# Patient Record
Sex: FEMALE | Race: BLACK OR AFRICAN AMERICAN | NOT HISPANIC OR LATINO | Employment: OTHER | URBAN - METROPOLITAN AREA
[De-identification: names, ages, dates, MRNs, and addresses within clinical notes are randomized per-mention and may not be internally consistent; named-entity substitution may affect disease eponyms.]

---

## 2017-01-11 PROBLEM — R92.8 ABNORMAL MAMMOGRAM OF LEFT BREAST: Status: ACTIVE | Noted: 2017-01-11

## 2017-06-19 ENCOUNTER — OFFICE VISIT (OUTPATIENT)
Dept: OBSTETRICS AND GYNECOLOGY | Facility: CLINIC | Age: 72
End: 2017-06-19
Payer: COMMERCIAL

## 2017-06-19 VITALS
SYSTOLIC BLOOD PRESSURE: 100 MMHG | WEIGHT: 202.81 LBS | HEIGHT: 62 IN | DIASTOLIC BLOOD PRESSURE: 60 MMHG | BODY MASS INDEX: 37.32 KG/M2

## 2017-06-19 DIAGNOSIS — Z01.419 ENCOUNTER FOR GYNECOLOGICAL EXAMINATION WITHOUT ABNORMAL FINDING: Primary | ICD-10-CM

## 2017-06-19 PROCEDURE — 99999 PR PBB SHADOW E&M-EST. PATIENT-LVL II: CPT | Mod: PBBFAC,,, | Performed by: OBSTETRICS & GYNECOLOGY

## 2017-06-19 PROCEDURE — 99397 PER PM REEVAL EST PAT 65+ YR: CPT | Mod: S$GLB,,, | Performed by: OBSTETRICS & GYNECOLOGY

## 2017-07-03 NOTE — PROGRESS NOTES
HISTORY OF PRESENT ILLNESS:    Shazia Mai is a 72 y.o. female,   presents today for her routine visit and has no complaints.      Past Medical History:   Diagnosis Date    Asthma     Dyslipidemia     Hypertension     Seizures        Past Surgical History:   Procedure Laterality Date    FRACTURE SURGERY  ,    R foot    HERNIA REPAIR      R inguinal    HYSTERECTOMY      INNER EAR SURGERY         MEDICATIONS AND ALLERGIES:      Current Outpatient Prescriptions:     albuterol (PROVENTIL) 2.5 mg /3 mL (0.083 %) nebulizer solution, Take 3 mLs (2.5 mg total) by nebulization every 6 (six) hours as needed for Wheezing., Disp: 1 Box, Rfl: 6    ergocalciferol (VITAMIN D2) 50,000 unit Cap, Take 1 capsule (50,000 Units total) by mouth every 30 days., Disp: 3 capsule, Rfl: 3    fluticasone-salmeterol 250-50 mcg/dose (ADVAIR DISKUS) 250-50 mcg/dose diskus inhaler, Inhale 1 puff into the lungs 2 (two) times daily., Disp: 3 each, Rfl: 4    fluticasone-salmeterol 250-50 mcg/dose (ADVAIR DISKUS) 250-50 mcg/dose diskus inhaler, Inhale 1 puff into the lungs 2 (two) times daily., Disp: 3 each, Rfl: 4    FLUVIRIN [id number] 45 mcg (15 mcg x 3)/0.5 mL Susp, TO BE ADMINISTERED BY PHARMACIST, Disp: , Rfl: 0    levetiracetam (KEPPRA) 500 MG Tab, Take 500 mg by mouth 2 (two) times daily., Disp: , Rfl: 5    montelukast (SINGULAIR) 10 mg tablet, Take 1 tablet (10 mg total) by mouth every evening., Disp: 90 tablet, Rfl: 3    rosuvastatin (CRESTOR) 20 MG tablet, Take 1 tablet (20 mg total) by mouth once daily., Disp: 90 tablet, Rfl: 3    telmisartan (MICARDIS) 40 MG Tab, Take 1 tablet (40 mg total) by mouth once daily., Disp: 90 tablet, Rfl: 4    valacyclovir (VALTREX) 500 MG tablet, Take 1 tablet (500 mg total) by mouth once daily., Disp: 90 tablet, Rfl: 3    calcium citrate-vitamin D3 (CITRACAL + D MAXIMUM) 315-250 mg-unit Tab, Take 315 mg by mouth 2 (two) times daily., Disp: 180 tablet, Rfl: 6     omeprazole (PRILOSEC) 40 MG capsule, Take 1 capsule (40 mg total) by mouth once daily., Disp: 30 capsule, Rfl: 3    Review of patient's allergies indicates:   Allergen Reactions    Avelox [moxifloxacin]     Duratuss [pseudoephedrine-guaifenesin]        Family History   Problem Relation Age of Onset    Hypertension Mother     Glaucoma Mother     Cancer Father      colon    Breast cancer Neg Hx     Ovarian cancer Neg Hx        Social History     Social History    Marital status:      Spouse name: N/A    Number of children: N/A    Years of education: N/A     Occupational History    Not on file.     Social History Main Topics    Smoking status: Never Smoker    Smokeless tobacco: Never Used    Alcohol use Yes      Comment: occasional    Drug use: No    Sexual activity: Yes     Partners: Male     Birth control/ protection: Surgical     Other Topics Concern    Not on file     Social History Narrative    No narrative on file       COMPREHENSIVE GYN HISTORY:  PAP History: Denies abnormal Paps.  Infection History: Denies STDs. Denies PID.  Benign History: Denies uterine fibroids. Denies ovarian cysts. Denies endometriosis. Denies other conditions.  Cancer History: Denies cervical cancer. Denies uterine cancer or hyperplasia. Denies ovarian cancer. Denies vulvar cancer or pre-cancer. Denies vaginal cancer or pre-cancer. Denies breast cancer. Denies colon cancer.  Sexual Activity History: Reports currently being sexually active  Menstrual History: Denies menses. Pt is  not on ERT.     ROS:  GENERAL: No weight changes. No swelling. No fatigue. No fever.  CARDIOVASCULAR: No chest pain. No shortness of breath. No leg cramps.   NEUROLOGICAL: No headaches. No vision changes.  BREASTS: No pain. No lumps. No discharge.  ABDOMEN: No pain. No nausea. No vomiting. No diarrhea. No constipation.  REPRODUCTIVE: No abnormal bleeding.  VULVA: No pain. No lesions. No itching.  VAGINA: No relaxation. No itching. No  "odor. No discharge. No lesions.  URINARY: No incontinence. No nocturia. No frequency. No dysuria.    /60   Ht 5' 2" (1.575 m)   Wt 92 kg (202 lb 13.2 oz)   BMI 37.10 kg/m²     PE:  APPEARANCE: Well nourished, well developed, in no acute distress.  AFFECT: WNL, alert and oriented x 3.  SKIN: No acne or hirsutism.  NECK: Neck symmetric without masses or thyromegaly.  NODES: No inguinal, cervical, axillary or femoral lymph node enlargement.  CHEST: Good respiratory effort.   ABDOMEN: Soft. No tenderness or masses. No hepatosplenomegaly. No hernias.  BREASTS: Symmetrical, no skin changes or visible lesions. No palpable masses, nipple discharge bilaterally.  PELVIC: ATROPHIC EXTERNAL FEMALE GENITALIA without lesions. Normal hair distribution. Adequate perineal body, normal urethral meatus. VAGINA DRY without lesions or discharge. No significant cystocele or rectocele. Bimanual exam shows CERVIX and UTERUS to be SURGICALLY ABSENT. Adnexa without masses or tenderness.  EXTREMITIES: No edema.    DIAGNOSIS:  1. Encounter for gynecological examination without abnormal finding             COUNSELING:  The patient was counseled today on  -osteoporosis prevention, calcium supplementation, regular weight bearing exercise.  -ACS PAP guidelines (no paps), with recommendations for yearly pelvic exams as her uterus and cervix were removed for benign reasons and ovaries remain;  -recommendation for yearly mammogram;  -to see her primary care physician for all other health maintenance.    FOLLOW-UP with me for next routine visit.     "

## 2017-08-14 ENCOUNTER — HOSPITAL ENCOUNTER (OUTPATIENT)
Facility: HOSPITAL | Age: 72
Discharge: HOME | End: 2017-08-15
Attending: EMERGENCY MEDICINE | Admitting: EMERGENCY MEDICINE
Payer: COMMERCIAL

## 2017-08-14 DIAGNOSIS — R07.9 CHEST PAIN: ICD-10-CM

## 2017-08-14 DIAGNOSIS — R42 VERTIGO: ICD-10-CM

## 2017-08-14 DIAGNOSIS — R07.9 CHEST PAIN, UNSPECIFIED TYPE: Primary | ICD-10-CM

## 2017-08-14 LAB
ALBUMIN SERPL BCP-MCNC: 3.9 G/DL
ALP SERPL-CCNC: 73 U/L
ALT SERPL W/O P-5'-P-CCNC: 13 U/L
ANION GAP SERPL CALC-SCNC: 7 MMOL/L
AST SERPL-CCNC: 16 U/L
BASOPHILS # BLD AUTO: 0.02 K/UL
BASOPHILS NFR BLD: 0.5 %
BILIRUB SERPL-MCNC: 1 MG/DL
BNP SERPL-MCNC: 47 PG/ML
BUN SERPL-MCNC: 7 MG/DL
CALCIUM SERPL-MCNC: 9.6 MG/DL
CHLORIDE SERPL-SCNC: 107 MMOL/L
CO2 SERPL-SCNC: 27 MMOL/L
CREAT SERPL-MCNC: 0.8 MG/DL
DIFFERENTIAL METHOD: ABNORMAL
EOSINOPHIL # BLD AUTO: 0.2 K/UL
EOSINOPHIL NFR BLD: 4.4 %
ERYTHROCYTE [DISTWIDTH] IN BLOOD BY AUTOMATED COUNT: 14.6 %
EST. GFR  (AFRICAN AMERICAN): >60 ML/MIN/1.73 M^2
EST. GFR  (NON AFRICAN AMERICAN): >60 ML/MIN/1.73 M^2
GLUCOSE SERPL-MCNC: 90 MG/DL
HCT VFR BLD AUTO: 38.7 %
HGB BLD-MCNC: 12.8 G/DL
INR PPP: 1.1
LYMPHOCYTES # BLD AUTO: 1.5 K/UL
LYMPHOCYTES NFR BLD: 33.9 %
MCH RBC QN AUTO: 29.1 PG
MCHC RBC AUTO-ENTMCNC: 33.1 G/DL
MCV RBC AUTO: 88 FL
MONOCYTES # BLD AUTO: 0.4 K/UL
MONOCYTES NFR BLD: 8.4 %
NEUTROPHILS # BLD AUTO: 2.3 K/UL
NEUTROPHILS NFR BLD: 52.8 %
PLATELET # BLD AUTO: 185 K/UL
PMV BLD AUTO: 10.3 FL
POTASSIUM SERPL-SCNC: 3.8 MMOL/L
PROT SERPL-MCNC: 7.1 G/DL
PROTHROMBIN TIME: 11.4 SEC
RBC # BLD AUTO: 4.4 M/UL
SODIUM SERPL-SCNC: 141 MMOL/L
TROPONIN I SERPL DL<=0.01 NG/ML-MCNC: <0.006 NG/ML
TROPONIN I SERPL DL<=0.01 NG/ML-MCNC: <0.006 NG/ML
WBC # BLD AUTO: 4.31 K/UL

## 2017-08-14 PROCEDURE — 84484 ASSAY OF TROPONIN QUANT: CPT

## 2017-08-14 PROCEDURE — 85610 PROTHROMBIN TIME: CPT

## 2017-08-14 PROCEDURE — 99285 EMERGENCY DEPT VISIT HI MDM: CPT

## 2017-08-14 PROCEDURE — 93005 ELECTROCARDIOGRAM TRACING: CPT

## 2017-08-14 PROCEDURE — 84484 ASSAY OF TROPONIN QUANT: CPT | Mod: 91

## 2017-08-14 PROCEDURE — 25000003 PHARM REV CODE 250: Performed by: INTERNAL MEDICINE

## 2017-08-14 PROCEDURE — 25000003 PHARM REV CODE 250: Performed by: EMERGENCY MEDICINE

## 2017-08-14 PROCEDURE — 83880 ASSAY OF NATRIURETIC PEPTIDE: CPT

## 2017-08-14 PROCEDURE — G0378 HOSPITAL OBSERVATION PER HR: HCPCS

## 2017-08-14 PROCEDURE — 80053 COMPREHEN METABOLIC PANEL: CPT

## 2017-08-14 PROCEDURE — 85025 COMPLETE CBC W/AUTO DIFF WBC: CPT

## 2017-08-14 PROCEDURE — 36415 COLL VENOUS BLD VENIPUNCTURE: CPT

## 2017-08-14 PROCEDURE — 63600175 PHARM REV CODE 636 W HCPCS: Performed by: EMERGENCY MEDICINE

## 2017-08-14 PROCEDURE — 93010 ELECTROCARDIOGRAM REPORT: CPT | Mod: ,,, | Performed by: INTERNAL MEDICINE

## 2017-08-14 RX ORDER — ACETAMINOPHEN 325 MG/1
650 TABLET ORAL EVERY 8 HOURS PRN
Status: DISCONTINUED | OUTPATIENT
Start: 2017-08-14 | End: 2017-08-15 | Stop reason: HOSPADM

## 2017-08-14 RX ORDER — ACETAMINOPHEN 325 MG/1
650 TABLET ORAL EVERY 6 HOURS PRN
Status: DISCONTINUED | OUTPATIENT
Start: 2017-08-14 | End: 2017-08-15 | Stop reason: HOSPADM

## 2017-08-14 RX ORDER — LEVETIRACETAM 500 MG/1
500 TABLET ORAL 2 TIMES DAILY
Status: DISCONTINUED | OUTPATIENT
Start: 2017-08-14 | End: 2017-08-15 | Stop reason: HOSPADM

## 2017-08-14 RX ORDER — ONDANSETRON 2 MG/ML
4 INJECTION INTRAMUSCULAR; INTRAVENOUS EVERY 12 HOURS PRN
Status: DISCONTINUED | OUTPATIENT
Start: 2017-08-14 | End: 2017-08-15 | Stop reason: HOSPADM

## 2017-08-14 RX ORDER — MORPHINE SULFATE 10 MG/ML
2 INJECTION INTRAMUSCULAR; INTRAVENOUS; SUBCUTANEOUS EVERY 4 HOURS PRN
Status: DISCONTINUED | OUTPATIENT
Start: 2017-08-14 | End: 2017-08-15 | Stop reason: HOSPADM

## 2017-08-14 RX ORDER — NAPROXEN SODIUM 220 MG/1
162 TABLET, FILM COATED ORAL DAILY
Status: DISCONTINUED | OUTPATIENT
Start: 2017-08-14 | End: 2017-08-15 | Stop reason: HOSPADM

## 2017-08-14 RX ORDER — MONTELUKAST SODIUM 10 MG/1
10 TABLET ORAL NIGHTLY
Status: DISCONTINUED | OUTPATIENT
Start: 2017-08-14 | End: 2017-08-15 | Stop reason: HOSPADM

## 2017-08-14 RX ORDER — ENOXAPARIN SODIUM 100 MG/ML
40 INJECTION SUBCUTANEOUS EVERY 24 HOURS
Status: DISCONTINUED | OUTPATIENT
Start: 2017-08-14 | End: 2017-08-15 | Stop reason: HOSPADM

## 2017-08-14 RX ORDER — ROSUVASTATIN CALCIUM 10 MG/1
20 TABLET, COATED ORAL NIGHTLY
Status: DISCONTINUED | OUTPATIENT
Start: 2017-08-14 | End: 2017-08-15 | Stop reason: HOSPADM

## 2017-08-14 RX ADMIN — ASPIRIN 81 MG 162 MG: 81 TABLET ORAL at 06:08

## 2017-08-14 RX ADMIN — MONTELUKAST SODIUM 10 MG: 10 TABLET, FILM COATED ORAL at 10:08

## 2017-08-14 RX ADMIN — LEVETIRACETAM 500 MG: 500 TABLET, FILM COATED ORAL at 10:08

## 2017-08-14 RX ADMIN — ROSUVASTATIN CALCIUM 20 MG: 10 TABLET ORAL at 10:08

## 2017-08-14 RX ADMIN — ENOXAPARIN SODIUM 40 MG: 100 INJECTION SUBCUTANEOUS at 10:08

## 2017-08-14 NOTE — ED TRIAGE NOTES
Pt c/o Rt sided chest pain for approx 1 week.  Pt states having pain to the LUE and Lt thigh radiating down to the Lt leg.  Pt states having generalized weakness.  Pt reports having dizziness.  Pt reports history of seizures.

## 2017-08-14 NOTE — ED PROVIDER NOTES
"Encounter Date: 8/14/2017    SCRIBE #1 NOTE: I, Faye Ruiz, am scribing for, and in the presence of,  Per Villa III, MD. I have scribed the following portions of the note - Other sections scribed: HPI and ROS.       History     Chief Complaint   Patient presents with    Chest Pain     chest pain on right side of chest "straight through to my back".  gone now. +nausea.  denies vomiting, diarrhea, or fever.    Dizziness     "room was spinning this morning'.  states she remains with feeling of being "off balance.".     CC: Chest Pain    HPI: This 72 y.o. Female who has Asthma, Dyslipidemia, Hypertension, and Seizures presents to the ED for an evaluation of acute onset, intermittent, 8/10 right sided chest pain radiating through to her back that began 1 week ago.  Patient reports the pain to her left upper and left lower extremity.  Patient reports this morning, she had an episode of dizziness described as the room spinning as well as nausea.  Patient reports she initially thought her symptoms were a result of her blood pressure, but reports she has been compliant with her medications.  She reports last taking her HTN medications this morning.  Patient denies fever, chills, emesis, diarrhea, abdominal pain, cough, rhinorrhea, or any other associated symptoms.  No prior tx.  No alleviating factors.    Of note, the patient was diagnosed with seizures last year and placed on Keppra; with no recent changes to her dosages.  Patient also reports having bilateral ear surgery to remove calcium buildup after having a decrease in hearing.        The history is provided by the patient. No  was used.     Review of patient's allergies indicates:   Allergen Reactions    Avelox [moxifloxacin]     Duratuss [pseudoephedrine-guaifenesin]      Past Medical History:   Diagnosis Date    Asthma     Dyslipidemia     Hypertension     Seizures      Past Surgical History:   Procedure Laterality Date    FRACTURE " SURGERY  1990,1998    R foot    HERNIA REPAIR      R inguinal    HYSTERECTOMY  1979    INNER EAR SURGERY       Family History   Problem Relation Age of Onset    Hypertension Mother     Glaucoma Mother     Cancer Father      colon    Breast cancer Neg Hx     Ovarian cancer Neg Hx      Social History   Substance Use Topics    Smoking status: Never Smoker    Smokeless tobacco: Never Used    Alcohol use Yes      Comment: occasional     Review of Systems   Constitutional: Negative for chills and fever.   HENT: Negative for congestion, ear pain, rhinorrhea and sore throat.    Eyes: Negative for pain.   Respiratory: Negative for cough and shortness of breath.    Cardiovascular: Positive for chest pain. Negative for leg swelling.   Gastrointestinal: Positive for nausea. Negative for abdominal pain, blood in stool, constipation, diarrhea and vomiting.   Genitourinary: Negative for difficulty urinating, dysuria, frequency, hematuria and urgency.   Musculoskeletal: Positive for back pain, myalgias and neck pain.   Skin: Negative for rash.   Neurological: Positive for dizziness. Negative for syncope, weakness, numbness and headaches.       Physical Exam     Initial Vitals [08/14/17 1141]   BP Pulse Resp Temp SpO2   (!) 179/84 (!) 54 16 98.6 °F (37 °C) 100 %      MAP       115.67         Physical Exam    Nursing note and vitals reviewed.  Constitutional: She appears well-developed and well-nourished. She is not diaphoretic. No distress.   HENT:   Head: Normocephalic and atraumatic.   Nose: Nose normal.   Mouth/Throat: Oropharynx is clear and moist. No oropharyngeal exudate.   Eyes: Conjunctivae and EOM are normal. Pupils are equal, round, and reactive to light. No scleral icterus.   Neck: Normal range of motion. Neck supple. No thyromegaly present. No tracheal deviation present.   Cardiovascular: Normal rate, regular rhythm and normal heart sounds. Exam reveals no gallop and no friction rub.    No murmur  heard.  Pulmonary/Chest: Breath sounds normal. No respiratory distress. She has no wheezes. She has no rhonchi. She has no rales.   Abdominal: Soft. Bowel sounds are normal. She exhibits no distension and no mass. There is no tenderness. There is no rebound and no guarding.   Musculoskeletal: Normal range of motion. She exhibits no edema or tenderness.   Lymphadenopathy:     She has no cervical adenopathy.   Neurological: She is alert and oriented to person, place, and time. She has normal strength. No cranial nerve deficit or sensory deficit.   Skin: Skin is warm and dry. No rash noted. No erythema. No pallor.   Psychiatric: She has a normal mood and affect. Her behavior is normal. Thought content normal.         ED Course   Procedures  Labs Reviewed   CBC W/ AUTO DIFFERENTIAL - Abnormal; Notable for the following:        Result Value    RDW 14.6 (*)     All other components within normal limits   COMPREHENSIVE METABOLIC PANEL - Abnormal; Notable for the following:     BUN, Bld 7 (*)     Anion Gap 7 (*)     All other components within normal limits   B-TYPE NATRIURETIC PEPTIDE   TROPONIN I   PROTIME-INR             Medical Decision Making:   Initial Assessment:   72-year-old female presents for evaluation of an episode of vertigo that began when she awoke this morning, described as room spinning, and also reporting increasingly frequent right sided chest tightness radiating to the back over the last week.  Physical examination essentially unremarkable, with normal coordination, normal gait, normal extraocular movements, normal cranial nerves, normal cardiopulmonary exam, no evidence of volume overload.  Patient is severely hypertensive in the emergency department.  Differential Diagnosis:   Peripheral vertigo, central vertigo, hypertensive urgency, acute coronary syndrome, pulmonary pathology, social pathology, chest wall pathology  Independently Interpreted Test(s):   I have ordered and independently interpreted  X-rays - see summary below.       <> Summary of X-Ray Reading(s): Chest x-ray: No acute abnormality  I have ordered and independently interpreted EKG Reading(s) - see summary below       <> Summary of EKG Reading(s): Sinus bradycardia at 52 bpm, normal axis, normal intervals, no acute ischemic change  ED Management:  Patient's workup is essentially unremarkable.  She is fairly high risk for coronary and cerebrovascular disease.  Will recommend admission for serial troponin and consideration of stress test given her increasingly frequent episodes of chest pain.  Also high risk for central cause of vertigo with reported difficulty ambulating and imbalance during vertiginous episode this morning, so will recommend MRI, neuro checks.             Scribe Attestation:   Scribe #1: I performed the above scribed service and the documentation accurately describes the services I performed. I attest to the accuracy of the note.    Attending Attestation:           Physician Attestation for Scribe:  Physician Attestation Statement for Scribe #1: I, Per Villa III, MD, reviewed documentation, as scribed by Faye Ruiz in my presence, and it is both accurate and complete.                 ED Course     Clinical Impression:   The primary encounter diagnosis was Chest pain, unspecified type. A diagnosis of Vertigo was also pertinent to this visit.                           Per Villa III, MD  08/14/17 7318

## 2017-08-15 VITALS
TEMPERATURE: 98 F | BODY MASS INDEX: 38.09 KG/M2 | HEART RATE: 60 BPM | WEIGHT: 207 LBS | SYSTOLIC BLOOD PRESSURE: 173 MMHG | OXYGEN SATURATION: 100 % | RESPIRATION RATE: 18 BRPM | HEIGHT: 62 IN | DIASTOLIC BLOOD PRESSURE: 90 MMHG

## 2017-08-15 PROBLEM — R42 VERTIGO: Status: ACTIVE | Noted: 2017-08-15

## 2017-08-15 PROBLEM — R53.1 LEFT-SIDED WEAKNESS: Status: ACTIVE | Noted: 2017-08-15

## 2017-08-15 LAB
ANION GAP SERPL CALC-SCNC: 10 MMOL/L
BASOPHILS # BLD AUTO: 0.02 K/UL
BASOPHILS NFR BLD: 0.4 %
BUN SERPL-MCNC: 9 MG/DL
CALCIUM SERPL-MCNC: 9.9 MG/DL
CHLORIDE SERPL-SCNC: 106 MMOL/L
CHOLEST/HDLC SERPL: 3 {RATIO}
CO2 SERPL-SCNC: 26 MMOL/L
CREAT SERPL-MCNC: 0.8 MG/DL
DIFFERENTIAL METHOD: ABNORMAL
EOSINOPHIL # BLD AUTO: 0.3 K/UL
EOSINOPHIL NFR BLD: 5.2 %
ERYTHROCYTE [DISTWIDTH] IN BLOOD BY AUTOMATED COUNT: 14.6 %
EST. GFR  (AFRICAN AMERICAN): >60 ML/MIN/1.73 M^2
EST. GFR  (NON AFRICAN AMERICAN): >60 ML/MIN/1.73 M^2
ESTIMATED PA SYSTOLIC PRESSURE: 24.8
GLOBAL PERICARDIAL EFFUSION: NORMAL
GLUCOSE SERPL-MCNC: 94 MG/DL
HCT VFR BLD AUTO: 39.2 %
HDL/CHOLESTEROL RATIO: 33.1 %
HDLC SERPL-MCNC: 142 MG/DL
HDLC SERPL-MCNC: 47 MG/DL
HGB BLD-MCNC: 12.9 G/DL
LDLC SERPL CALC-MCNC: 75.6 MG/DL
LYMPHOCYTES # BLD AUTO: 1.8 K/UL
LYMPHOCYTES NFR BLD: 37.8 %
MCH RBC QN AUTO: 28.8 PG
MCHC RBC AUTO-ENTMCNC: 32.9 G/DL
MCV RBC AUTO: 88 FL
MITRAL VALVE REGURGITATION: NORMAL
MONOCYTES # BLD AUTO: 0.4 K/UL
MONOCYTES NFR BLD: 9.1 %
NEUTROPHILS # BLD AUTO: 2.3 K/UL
NEUTROPHILS NFR BLD: 47.5 %
NONHDLC SERPL-MCNC: 95 MG/DL
PLATELET # BLD AUTO: 169 K/UL
PMV BLD AUTO: 10.1 FL
POCT GLUCOSE: 84 MG/DL (ref 70–110)
POCT GLUCOSE: 85 MG/DL (ref 70–110)
POTASSIUM SERPL-SCNC: 3.6 MMOL/L
RBC # BLD AUTO: 4.48 M/UL
RETIRED EF AND QEF - SEE NOTES: 55 (ref 55–65)
SODIUM SERPL-SCNC: 142 MMOL/L
TRICUSPID VALVE REGURGITATION: NORMAL
TRIGL SERPL-MCNC: 97 MG/DL
TROPONIN I SERPL DL<=0.01 NG/ML-MCNC: <0.006 NG/ML
TSH SERPL DL<=0.005 MIU/L-ACNC: 0.92 UIU/ML
WBC # BLD AUTO: 4.84 K/UL

## 2017-08-15 PROCEDURE — 85025 COMPLETE CBC W/AUTO DIFF WBC: CPT

## 2017-08-15 PROCEDURE — 84443 ASSAY THYROID STIM HORMONE: CPT

## 2017-08-15 PROCEDURE — 93017 CV STRESS TEST TRACING ONLY: CPT

## 2017-08-15 PROCEDURE — 80061 LIPID PANEL: CPT

## 2017-08-15 PROCEDURE — 25000003 PHARM REV CODE 250

## 2017-08-15 PROCEDURE — 80048 BASIC METABOLIC PNL TOTAL CA: CPT

## 2017-08-15 PROCEDURE — 25000003 PHARM REV CODE 250: Performed by: INTERNAL MEDICINE

## 2017-08-15 PROCEDURE — G0378 HOSPITAL OBSERVATION PER HR: HCPCS

## 2017-08-15 PROCEDURE — 63600175 PHARM REV CODE 636 W HCPCS: Performed by: EMERGENCY MEDICINE

## 2017-08-15 PROCEDURE — 93306 TTE W/DOPPLER COMPLETE: CPT

## 2017-08-15 PROCEDURE — 25000003 PHARM REV CODE 250: Performed by: EMERGENCY MEDICINE

## 2017-08-15 PROCEDURE — 36415 COLL VENOUS BLD VENIPUNCTURE: CPT

## 2017-08-15 PROCEDURE — 84484 ASSAY OF TROPONIN QUANT: CPT

## 2017-08-15 PROCEDURE — 63600175 PHARM REV CODE 636 W HCPCS

## 2017-08-15 RX ORDER — TELMISARTAN 40 MG/1
40 TABLET ORAL ONCE
Status: COMPLETED | OUTPATIENT
Start: 2017-08-15 | End: 2017-08-15

## 2017-08-15 RX ORDER — TELMISARTAN 80 MG/1
80 TABLET ORAL DAILY
Qty: 30 TABLET | Refills: 11 | Status: SHIPPED | OUTPATIENT
Start: 2017-08-16 | End: 2017-10-23 | Stop reason: SDUPTHER

## 2017-08-15 RX ORDER — ROSUVASTATIN CALCIUM 10 MG/1
20 TABLET, COATED ORAL DAILY
Status: DISCONTINUED | OUTPATIENT
Start: 2017-08-15 | End: 2017-08-15

## 2017-08-15 RX ORDER — LEVETIRACETAM 500 MG/1
500 TABLET ORAL 2 TIMES DAILY
Status: DISCONTINUED | OUTPATIENT
Start: 2017-08-15 | End: 2017-08-15

## 2017-08-15 RX ORDER — NAPROXEN SODIUM 220 MG/1
162 TABLET, FILM COATED ORAL DAILY
Refills: 0
Start: 2017-08-15 | End: 2019-12-18 | Stop reason: SDUPTHER

## 2017-08-15 RX ORDER — MONTELUKAST SODIUM 10 MG/1
10 TABLET ORAL NIGHTLY
Status: DISCONTINUED | OUTPATIENT
Start: 2017-08-15 | End: 2017-08-15

## 2017-08-15 RX ORDER — TELMISARTAN 40 MG/1
40 TABLET ORAL DAILY
Status: DISCONTINUED | OUTPATIENT
Start: 2017-08-15 | End: 2017-08-15

## 2017-08-15 RX ORDER — AMLODIPINE BESYLATE 5 MG/1
10 TABLET ORAL DAILY
Status: DISCONTINUED | OUTPATIENT
Start: 2017-08-15 | End: 2017-08-15 | Stop reason: HOSPADM

## 2017-08-15 RX ORDER — AMLODIPINE BESYLATE 10 MG/1
10 TABLET ORAL DAILY
Qty: 30 TABLET | Refills: 11 | Status: SHIPPED | OUTPATIENT
Start: 2017-08-15 | End: 2018-03-12

## 2017-08-15 RX ORDER — ACETAMINOPHEN 325 MG/1
650 TABLET ORAL EVERY 8 HOURS PRN
Refills: 0
Start: 2017-08-15

## 2017-08-15 RX ORDER — REGADENOSON 0.08 MG/ML
INJECTION, SOLUTION INTRAVENOUS
Status: DISCONTINUED
Start: 2017-08-15 | End: 2017-08-15 | Stop reason: WASHOUT

## 2017-08-15 RX ORDER — VALACYCLOVIR HYDROCHLORIDE 500 MG/1
500 TABLET, FILM COATED ORAL DAILY
Status: DISCONTINUED | OUTPATIENT
Start: 2017-08-15 | End: 2017-08-15 | Stop reason: HOSPADM

## 2017-08-15 RX ORDER — TELMISARTAN 40 MG/1
80 TABLET ORAL DAILY
Status: DISCONTINUED | OUTPATIENT
Start: 2017-08-16 | End: 2017-08-15 | Stop reason: HOSPADM

## 2017-08-15 RX ORDER — REGADENOSON 0.08 MG/ML
INJECTION, SOLUTION INTRAVENOUS
Status: DISCONTINUED
Start: 2017-08-15 | End: 2017-08-15 | Stop reason: HOSPADM

## 2017-08-15 RX ADMIN — VALACYCLOVIR HYDROCHLORIDE 500 MG: 500 TABLET, FILM COATED ORAL at 09:08

## 2017-08-15 RX ADMIN — AMLODIPINE BESYLATE 10 MG: 5 TABLET ORAL at 05:08

## 2017-08-15 RX ADMIN — TELMISARTAN 40 MG: 40 TABLET ORAL at 09:08

## 2017-08-15 RX ADMIN — LEVETIRACETAM 500 MG: 500 TABLET, FILM COATED ORAL at 09:08

## 2017-08-15 RX ADMIN — ENOXAPARIN SODIUM 40 MG: 100 INJECTION SUBCUTANEOUS at 04:08

## 2017-08-15 RX ADMIN — TELMISARTAN 40 MG: 40 TABLET ORAL at 05:08

## 2017-08-15 RX ADMIN — ASPIRIN 81 MG 162 MG: 81 TABLET ORAL at 09:08

## 2017-08-15 NOTE — PROGRESS NOTES
"Call placed to Dr. Azul office [phone], spoke to Halie. Follow up appointment arranged for patient to see MD on 8/23/2017 @ 11AM. All information added to "follow up" tab.   "

## 2017-08-15 NOTE — HOSPITAL COURSE
She noted that the chest pain was gone in the ER.  Troponin <0.006.  Because of symptoms of CP with vertigo, and risk factors of obesity, HTN, and hyperlipidemia, cardiology is consulted.  Her symptoms mostly abated shortly after admit - back to baseline  Echo and Stress testing done; no acute pathology  MRI showed no CVA  Dr. Hoffmann adjusted bp meds - will follow in clinic

## 2017-08-15 NOTE — PROGRESS NOTES
Patient returned from procedure. Telemetry monitoring continued. Patient accompanied back in bed. Patient denies SOB or difficulty breathing.  Patient AAOx4. No signs of distress noted.

## 2017-08-15 NOTE — HPI
71 yo BF pt of Dr. Lamb with acute onset of right-sided chest pain that radiated to her back beginning a week ago.  She ranked it as 8/10.  At the same time, she felt pain in her left arm and leg.  On the day of admit, she had vertigo (room spinning) with nausea.  She initially thought this was due to her BP, but says she took her meds.  She was diagnosed with seizures last year and started on Keppra.  She has had bilateral ear surgery to remove calcium deposits causing decreased hearing.

## 2017-08-15 NOTE — PROGRESS NOTES
Patient went to scheduled procedure as ordered. Patient Awake alert and oriented without complaints of discomfort no apparent distress noted.

## 2017-08-15 NOTE — ASSESSMENT & PLAN NOTE
Acute onset  Felt like aching sensation in L arm  Gait not affected  Seems resolved now - she feels back at baseline  MRI done:  Chronic microvascular changes, partial empty sella, nothing acute

## 2017-08-15 NOTE — ASSESSMENT & PLAN NOTE
Due to elevated bp?  Seizure?  CAD?  History of calcium deposits in ear were due to bone deposits; not affecting semicircular canals  Resolved while in hospital - back to baseline

## 2017-08-15 NOTE — ASSESSMENT & PLAN NOTE
Up to 221/102  Now controlled - 131/69  Then bounced back to 173/90  Resume home meds;  Dr. Hoffmann bumped micardis to 80  Added norvasc 10

## 2017-08-15 NOTE — ASSESSMENT & PLAN NOTE
Right-sided, radiating to back  Resolved in ER  Troponin initially neg, but card consulted due to cad risk factors  To have echo (done) and lexiscan  If these are negative and she is cleared, could dc home today

## 2017-08-15 NOTE — UM SECONDARY REVIEW
Medical Affairs    Observation > 48 hours  71 yo BF pt of Dr. Lamb with acute onset of right-sided chest pain that radiated to her back beginning a week ago.  She ranked it as 8/10.  At the same time, she felt pain in her left arm and leg.  On the day of admit, she had vertigo (room spinning) with nausea.  She initially thought this was due to her BP, but says she took her meds.  She was diagnosed with seizures last year and started on Keppra.  She has had bilateral ear surgery to remove calcium deposits causing decreased hearing.    Cards Consulted, Dr Hoffmann, ECHO and STRESS in Progress,  If she is cleared by cards later today, can plan DC home  Approved continued OBS   LATE REVIEW - approved

## 2017-08-15 NOTE — H&P
"[facility] Hospital Medicine  History & Physical    Patient Name: Shazia Mai  MRN: [id number]  Admission Date: 8/14/2017  Attending Physician: Albert Azul MD  Primary Care Provider: Ginny Lamb MD (Inactive)         Patient information was obtained from patient     Subjective:     Principal Problem:Chest pain    Chief Complaint:   Chief Complaint   Patient presents with    Chest Pain     chest pain on right side of chest "straight through to my back".  gone now. +nausea.  denies vomiting, diarrhea, or fever.    Dizziness     "room was spinning this morning'.  states she remains with feeling of being "off balance.".        HPI: 71 yo BF pt of Dr. Lamb with acute onset of right-sided chest pain that radiated to her back beginning a week ago.  She ranked it as 8/10.  At the same time, she felt pain in her left arm and leg.  On the day of admit, she had vertigo (room spinning) with nausea.  She initially thought this was due to her BP, but says she took her meds.  She was diagnosed with seizures last year and started on Keppra.  She has had bilateral ear surgery to remove calcium deposits causing decreased hearing.    No new subjective & objective note has been filed under this hospital service since the last note was generated.    Awake, alert, no distress  Feels she is essentially back to baseline  No carotid bruits  Lungs clear  Heart rrr, distant  abd obese, soft, BS+  Legs - raise against pressure equally   is equal    Cxr:  Findings:  The cardiomediastinal silhouette is prominent, similar to the previous exam.  There is no pleural effusion.  The trachea is midline.  The lungs are symmetrically expanded bilaterally without evidence of acute parenchymal process. No large focal consolidation seen.  There is bilateral basilar subsegmental atelectasis or scarring, left greater than right.  There is no pneumothorax.  The osseous structures are remarkable for degenerative changes " of the spine and shoulders.    MRI:  Symmetric and normal lateral ventricles without hydrocephalus.  In the periventricular white matter of the corona there are a few scattered T2 hyperintense lesions without restricted diffusion or abnormal mass effects.  These are nonspecific but most likely representative of chronic microvascular ischemia.    Within the parenchyma no definite signs for acute infarctions, neoplasms, shift of the midline structures or herniations.  There is no abnormal extra-axial fluid collections.    The brainstem, cerebellar hemispheres and the 4th ventricle are unremarkable.  There is normal cerebellopontine angle cisterns bilaterally.  Enlarged sella turcica with a partially empty sella noted.  There is no suprasellar or pineal region masses.  Ocular globes are symmetric and normal bilaterally.  The visualized paranasal air sinuses are clear.  Nasopharynx is unremarkable.    Assessment/Plan:     Left-sided weakness    Acute onset  Felt like aching sensation in L arm  Gait not affected  Seems resolved now - she feels back at baseline  MRI done:  Chronic microvascular changes, partial empty sella, nothing acute          Vertigo    Due to elevated bp?  Seizure?  CAD?  History of calcium deposits in ear were due to bone deposits; not affecting semicircular canals          Seizure disorder    On keppra  Vertigo could be manifestation        Essential hypertension, benign      Up to 221/102  Now controlled - 131/69  Resume home meds        * Chest pain    Right-sided, radiating to back  Resolved in ER  Troponin initially neg, but card consulted due to cad risk factors  To have echo (done) and lexiscan  If these are negative and she is cleared, could dc home today          VTE Risk Mitigation         Ordered     enoxaparin injection 40 mg  Daily     Route:  Subcutaneous        08/14/17 2027     Medium Risk of VTE  Once      08/14/17 2027     Place JULIA hose  Until discontinued      08/14/17 2027      Place sequential compression device  Until discontinued      08/14/17 2027         If she is cleared by cards later today, can plan DC home    Albert Azul MD  Department of Hospital Medicine   [facility]

## 2017-08-15 NOTE — PROGRESS NOTES
Lexiscan stress negative for ischemia with EF 75%  ECHO without significant abnormalites  BP remains elevated  Will increase micardis to 80 mg and add norvasc 10 mg. Daily  Will follow up in clinic

## 2017-08-15 NOTE — CONSULTS
Dictation #2  MRN:[id number]  CSN:[id number]     Consult dictated  [id number]    Hypertensive urgency  Chest pain acute MI ruled out  dizziness  Hyperlipidemia  History of seizures    ECHO  Lexiscan stress

## 2017-08-15 NOTE — PLAN OF CARE
08/15/17 1628   Discharge Assessment   Assessment Type Discharge Planning Assessment   Confirmed/corrected address and phone number on facesheet? Yes   Assessment information obtained from? Medical Record   Communicated expected length of stay with patient/caregiver no   Type of Healthcare Directive Received (None)   Prior to hospitilization cognitive status: Alert/Oriented   Prior to hospitalization functional status: Independent   Current cognitive status: Alert/Oriented   Current Functional Status: Independent   Arrived From home or self-care   Lives With alone   Able to Return to Prior Arrangements yes   Is patient able to care for self after discharge? Yes   How many people do you have in your home that can help with your care after discharge? 0   Who are your caregiver(s) and their phone number(s)? (Sister, Jenae Ziegler, [phone].)   Patient's perception of discharge disposition home or selfcare   Readmission Within The Last 30 Days no previous admission in last 30 days   Patient currently being followed by outpatient case management? No   Patient currently receives home health services? No   Does the patient currently use HME? No   Patient currently receives private duty nursing? N/A   Patient currently receives any other outside agency services? No   Equipment Currently Used at Home none   Do you have any problems affording any of your prescribed medications? No   Is the patient taking medications as prescribed? yes   Do you have any financial concerns preventing you from receiving the healthcare you need? No   Does the patient have transportation to healthcare appointments? Yes   On Dialysis? No   Does the patient receive services at the Coumadin Clinic? No   Are there any open cases? No   Discharge Plan A Home   Patient/Family In Agreement With Plan yes

## 2017-08-15 NOTE — DISCHARGE SUMMARY
[facility] Hospital Medicine  Discharge Summary      Patient Name: Shazia Mai  MRN: [id number]  Admission Date: 8/14/2017  Hospital Length of Stay: 0 days  Discharge Date and Time: 8/14/17  Attending Physician: Albert Azul MD   Discharging Provider: Albert Azul MD  Primary Care Provider: Ginny Lamb MD (Inactive)      HPI:   73 yo BF pt of Dr. Lamb with acute onset of right-sided chest pain that radiated to her back beginning a week ago.  She ranked it as 8/10.  At the same time, she felt pain in her left arm and leg.  On the day of admit, she had vertigo (room spinning) with nausea.  She initially thought this was due to her BP, but says she took her meds.  She was diagnosed with seizures last year and started on Keppra.  She has had bilateral ear surgery to remove calcium deposits causing decreased hearing.    * No surgery found *      Indwelling Lines/Drains at time of discharge:   Lines/Drains/Airways          No matching active lines, drains, or airways        Hospital Course:   She noted that the chest pain was gone in the ER.  Troponin <0.006.  Because of symptoms of CP with vertigo, and risk factors of obesity, HTN, and hyperlipidemia, cardiology is consulted.  Her symptoms mostly abated shortly after admit - back to baseline  Echo and Stress testing done; no acute pathology  MRI showed no CVA  Dr. Hoffmann adjusted bp meds - will follow in clinic     Consults:   Consults         Status Ordering Provider     Inpatient consult to Cardiology  Once     Provider:  Blake Key MD    Acknowledged LAYTON DENNISON III          Significant Diagnostic Studies:  Cxr:  Findings:  The cardiomediastinal silhouette is prominent, similar to the previous exam.  There is no pleural effusion.  The trachea is midline.  The lungs are symmetrically expanded bilaterally without evidence of acute parenchymal process. No large focal consolidation seen.  There is bilateral basilar  subsegmental atelectasis or scarring, left greater than right.  There is no pneumothorax.  The osseous structures are remarkable for degenerative changes of the spine and shoulders.    Mri:  Symmetric and normal lateral ventricles without hydrocephalus.  In the periventricular white matter of the corona there are a few scattered T2 hyperintense lesions without restricted diffusion or abnormal mass effects.  These are nonspecific but most likely representative of chronic microvascular ischemia.    Within the parenchyma no definite signs for acute infarctions, neoplasms, shift of the midline structures or herniations.  There is no abnormal extra-axial fluid collections.    The brainstem, cerebellar hemispheres and the 4th ventricle are unremarkable.  There is normal cerebellopontine angle cisterns bilaterally.  Enlarged sella turcica with a partially empty sella noted.  There is no suprasellar or pineal region masses.  Ocular globes are symmetric and normal bilaterally.  The visualized paranasal air sinuses are clear.  Nasopharynx is unremarkable.    Echo:  EF 55 - 65 55   Mitral Valve Regurgitation  TRIVIAL   Est. PA Systolic Pressure  24.8   Pericardial Effusion  TRIVIAL   Tricuspid Valve Regurgitation   TRIVIAL TO MILD   CONCLUSIONS     1 - Concentric hypertrophy.     2 - Normal left ventricular systolic function (EF 55-60%).     3 - Trivial mitral regurgitation.     4 - Trivial to mild tricuspid regurgitation.     5 - Trivial pericardial effusion.     Lexiscan:  Review in progress    Pending Diagnostic Studies:     Procedure Component Value Units Date/Time    NM Myocardial Perfusion Spect Multi Pharmacologic [[id number]] Resulted:  08/15/17 0929    Order Status:  Sent Lab Status:  In process Updated:  08/15/17 1556        Final Active Diagnoses:    Diagnosis Date Noted POA    PRINCIPAL PROBLEM:  Chest pain [R07.9] 08/14/2017 Yes    Vertigo [R42] 08/15/2017 Unknown    Left-sided weakness [R53.1] 08/15/2017 Yes     Seizure disorder [G40.909] 03/07/2016 Yes    Essential hypertension, benign [I10] 09/24/2012 Yes      Problems Resolved During this Admission:    Diagnosis Date Noted Date Resolved POA      Left-sided weakness    Acute onset  Felt like aching sensation in L arm  Gait not affected  Seems resolved now - she feels back at baseline  MRI done:  Chronic microvascular changes, partial empty sella, nothing acute          Vertigo    Due to elevated bp?  Seizure?  CAD?  History of calcium deposits in ear were due to bone deposits; not affecting semicircular canals  Resolved while in hospital - back to baseline        Seizure disorder    On keppra  Vertigo could be manifestation        Essential hypertension, benign    Up to 221/102  Now controlled - 131/69  Then bounced back to 173/90  Resume home meds;  Dr. Hoffmann bumped micardis to 80  Added norvasc 10        * Chest pain    Right-sided, radiating to back  Resolved in ER  Troponin initially neg, but card consulted due to cad risk factors  To have echo (done) and lexiscan  Dr. Hoffmann's report:  Lexiscan stress negative for ischemia with EF 75%  ECHO without significant abnormalites  BP remains elevated  Will increase micardis to 80 mg and add norvasc 10 mg. Daily  Will follow up in clinic            Discharged Condition: good  Diet:  Regular  Activity:  Ad jonas    Disposition: Home or Self Care    Follow Up:  Follow-up Information     Albert Azul MD. Go on 8/23/2017.    Specialty:  Internal Medicine  Why:  For Appointment on Wednesday 8/23/2017 @ 11AM  Contact information:  [address]  [facility] [address]  [phone]             Jelena Hoffmann MD In 2 weeks.    Specialty:  Internal Medicine  Why:  cardiology followup  Contact information:  [address]  [facility] [id number] [phone]                    Medication List      START taking these medications    acetaminophen 325 MG tablet  Commonly known as:   TYLENOL  Take 2 tablets (650 mg total) by mouth every 8 (eight) hours as needed.     amlodipine 10 MG tablet  Commonly known as:  NORVASC  Take 1 tablet (10 mg total) by mouth once daily.     aspirin 81 MG Chew  Take 2 tablets (162 mg total) by mouth once daily.        CHANGE how you take these medications    telmisartan 80 MG Tab  Commonly known as:  MICARDIS  Take 1 tablet (80 mg total) by mouth once daily.  Start taking on:  8/16/2017  What changed:  · medication strength  · how much to take        CONTINUE taking these medications    albuterol 2.5 mg /3 mL (0.083 %) nebulizer solution  Commonly known as:  PROVENTIL  Take 3 mLs (2.5 mg total) by nebulization every 6 (six) hours as needed for Wheezing.     ergocalciferol 50,000 unit Cap  Commonly known as:  VITAMIN D2  Take 1 capsule (50,000 Units total) by mouth every 30 days.     fluticasone-salmeterol 250-50 mcg/dose 250-50 mcg/dose diskus inhaler  Commonly known as:  ADVAIR DISKUS  Inhale 1 puff into the lungs 2 (two) times daily.     levetiracetam 500 MG Tab  Commonly known as:  KEPPRA     montelukast 10 mg tablet  Commonly known as:  SINGULAIR  Take 1 tablet (10 mg total) by mouth every evening.     rosuvastatin 20 MG tablet  Commonly known as:  CRESTOR  Take 1 tablet (20 mg total) by mouth once daily.     valacyclovir 500 MG tablet  Commonly known as:  VALTREX  Take 1 tablet (500 mg total) by mouth once daily.        STOP taking these medications    calcium citrate-vitamin D3 315-250 mg-unit Tab  Commonly known as:  CITRACAL + D MAXIMUM           Where to Get Your Medications      These medications were [place] [facility] [place] [address]  [address] [address]    Phone:  [phone]   · amlodipine 10 MG tablet  · telmisartan 80 MG Tab     Information about where to get these medications is not yet available    Ask your nurse or doctor about these medications  · acetaminophen 325 MG tablet  · aspirin 81 MG  Sagrario Azul MD  Department of Hospital Medicine  [facility]

## 2017-08-15 NOTE — PLAN OF CARE
Problem: Hypertensive Disease/Crisis (Arterial) (Adult)  Intervention: Cluster Activity/Decrease Environmental Stimulation   08/15/17 1819   Cognitive Interventions   Sensory Stimulation Regulation auditory stimulation minimized     Intervention: Promote Rest/Minimize Oxygen Consumption   08/15/17 1819   Activity   Activity Type activity adjusted per tolerance     Intervention: Support/Optimize Psychosocial Response to Illness   08/15/17 1819   Coping/Psychosocial Interventions   Supportive Measures active listening utilized     Intervention: Gradually Decrease Blood Pressure   08/15/17 1819   Safety Interventions   Medication Review/Management medications reviewed       Goal: Signs and Symptoms of Listed Potential Problems Will be Absent, Minimized or Managed (Hypertensive Disease/Crisis)  Signs and symptoms of listed potential problems will be absent, minimized or managed by discharge/transition of care (reference Hypertensive Disease/Crisis (Arterial) (Adult) CPG).  Outcome: Ongoing (interventions implemented as appropriate)   08/15/17 1819   Hypertensive Disease/Crisis (Arterial)   Problems Assessed (Hypertensive Disease/Crisis (Arterial)) all

## 2017-08-15 NOTE — CONSULTS
DATE OF SERVICE:  08/15/2017.    REQUESTING PHYSICIAN:  Dr. Azul.    REASON FOR CONSULTATION:  Chest pain.    HISTORY OF PRESENT ILLNESS:  The patient is a 72-year-old female.  She has no   history of coronary artery disease.  She does have hypertension and   hyperlipidemia.  [facility], she had a workup   in the past including a dobutamine Cardiolite, which revealed EF of 75% and was   negative for ischemia.  An echocardiogram in 2010 showed left ventricular   hypertrophy.  The patient presented to the Emergency Room with complaints of   dizziness, which began in the morning of her admission.  This was associated   with some nausea, which persisted until she ate something she also developed   discomfort in her left shoulder and arm as well as pressure and sharp pain in   the right side of her chest that radiated through to her back.  The discomfort   in her chest was intermittent and lasted 1-2 minutes at a time.  It occurred at   rest.  There was no associated shortness of breath, diaphoresis or nausea.  It   had been occurring intermittently over the past week.  She denies any exertional   chest discomfort.  She has had none nocturnally.  The discomfort in her left   arm, lasted several hours prior to being relieved.  She denied any edema, no   wheezing or coughing.  No palpitations.  No syncope.  She did go to a pharmacy   and checked her blood pressure, which she states it is high, and she presented   to the Emergency Room where she was noted to have significant hypertension with   blood pressure initially 179/84 and subsequently increased to 221/102.  She   currently is normotensive and pain-free.    PAST MEDICAL HISTORY:  Positive for hypertension, hypercholesterolemia.  There   is no history of diabetes.  She does have a seizure disorder and a history of   asthma.    PAST SURGICAL HISTORY:  Right foot surgery, right inguinal hernia repair,   hysterectomy and inner ear  surgery.    SOCIAL HISTORY:  She has never smoked.    FAMILY HISTORY:  Negative for premature heart disease.    HOME MEDICATIONS:  Proventil, Citracal, vitamin D, Advair, Keppra, Micardis,   Valtrex, Singulair and Crestor.    ALLERGIES:  AVELOX , DURATUSS    REVIEW OF SYSTEMS:  No fever, chills, visual disturbances or nosebleeds.  No   wheezing or coughing.  No abdominal pain or vomiting.  No diarrhea.  No dysuria.    No specific joint pains or muscle weakness.  No history of stroke.  No   bleeding or bruising.    PHYSICAL EXAMINATION:  GENERAL:  The patient is a pleasant female in no distress.  She is awake, alert   and oriented.  VITAL SIGNS:  Blood pressure 131/69, heart rate 56.  HEENT:  Normocephalic.  Pupils are reactive.  Extraocular muscles are intact.    No scleral icterus.  No xanthelasma.  Mucous membranes are moist.  NECK:  Supple without masses or thyromegaly.  Carotids are brisk bilaterally   without bruits.  LUNGS:  Clear anteriorly and posteriorly to auscultation.  HEART:  Apical impulse is not palpable.  There is a regular rhythm.  S1 and S2   are normal.  There is no S3, S4 or murmurs.  ABDOMEN:  Soft, nontender without organomegaly or masses.  EXTREMITIES:  No clubbing, cyanosis or edema.  Pulses are 2+.  NEUROLOGIC:  Intact.  SKIN:  Warm and dry.    LABORATORY DATA:  Hemoglobin 12.9, hematocrit 39.2 and white count 4.84.  Sodium   142, potassium 3.6, chloride 106, CO2 of 26, BUN 9, creatinine 0.8.  Troponins   are negative x3.  BNP 47.  EKG shows a normal sinus rhythm with nonspecific   T-wave changes.  Chest x-ray is unremarkable.    IMPRESSION:  1.  Hypertensive urgency.  2.  Chest pain, atypical.  Acute MI has been ruled out.  3.  Dizziness.  4.  Hyperlipidemia.  5.  History of seizures.    RECOMMENDATIONS:  We will go ahead and check an echocardiogram and a Lexiscan   stress and will follow up after the above.      BEATRIZ/IN  dd: 08/15/2017 08:04:51 (CDT)  td: 08/15/2017 16:30:23 (CDT)  Doc ID    #[id number]  Job ID #[id number]    CC:

## 2017-08-15 NOTE — ASSESSMENT & PLAN NOTE
Due to elevated bp?  Seizure?  CAD?  History of calcium deposits in ear were due to bone deposits; not affecting semicircular canals

## 2017-08-15 NOTE — ASSESSMENT & PLAN NOTE
Right-sided, radiating to back  Resolved in ER  Troponin initially neg, but card consulted due to cad risk factors  To have echo (done) and lexiscan  Dr. Hoffmann's report:  Lexiscan stress negative for ischemia with EF 75%  ECHO without significant abnormalites  BP remains elevated  Will increase micardis to 80 mg and add norvasc 10 mg. Daily  Will follow up in clinic

## 2017-08-16 NOTE — PROGRESS NOTES
Patient is discharged. Telemetry monitor removed. IV taken out. Tip intact. Patient discharged to vehicle by transport.

## 2017-08-17 NOTE — PROCEDURES
Lexiscan Stress Test    The patient received intravenous Lexiscan.  There were no symptoms reported.  No   EKG changes.  Blood pressure was normal.    The perfusion scan was probably normal showing no definite evidence of ischemia.    The gated MIBI was normal, EF 77%.    Recommend clinical correlation.      EK/HN  dd: 08/15/2017 17:05:54 (CDT)  td: 08/16/2017 10:19:50 (CDT)  Doc ID   #[id number]  Job ID #[id number]    CC:

## 2017-10-23 PROBLEM — J45.40 ASTHMA WITHOUT STATUS ASTHMATICUS, MODERATE PERSISTENT, UNCOMPLICATED: Status: ACTIVE | Noted: 2017-10-23

## 2017-10-23 PROBLEM — J45.41: Status: ACTIVE | Noted: 2017-10-23

## 2017-10-23 PROBLEM — I10 HTN, GOAL BELOW 140/90: Status: ACTIVE | Noted: 2017-10-23

## 2017-10-23 PROBLEM — Z23 FLU VACCINE NEED: Status: ACTIVE | Noted: 2017-10-23

## 2017-10-23 PROBLEM — J45.20 ASTHMA, INTERMITTENT, UNCOMPLICATED: Status: ACTIVE | Noted: 2017-10-23

## 2018-03-12 ENCOUNTER — OFFICE VISIT (OUTPATIENT)
Dept: OBSTETRICS AND GYNECOLOGY | Facility: CLINIC | Age: 73
End: 2018-03-12
Payer: MEDICARE

## 2018-03-12 VITALS — BODY MASS INDEX: 38.91 KG/M2 | HEIGHT: 62 IN | WEIGHT: 211.44 LBS

## 2018-03-12 DIAGNOSIS — R30.0 DYSURIA: Primary | ICD-10-CM

## 2018-03-12 DIAGNOSIS — N89.8 VAGINAL IRRITATION: ICD-10-CM

## 2018-03-12 DIAGNOSIS — R03.0 ELEVATED BLOOD PRESSURE READING: ICD-10-CM

## 2018-03-12 LAB
CANDIDA RRNA VAG QL PROBE: NEGATIVE
G VAGINALIS RRNA GENITAL QL PROBE: NEGATIVE
T VAGINALIS RRNA GENITAL QL PROBE: NEGATIVE

## 2018-03-12 PROCEDURE — 99213 OFFICE O/P EST LOW 20 MIN: CPT | Mod: S$PBB,,, | Performed by: NURSE PRACTITIONER

## 2018-03-12 PROCEDURE — 99213 OFFICE O/P EST LOW 20 MIN: CPT | Mod: PBBFAC | Performed by: NURSE PRACTITIONER

## 2018-03-12 PROCEDURE — 87480 CANDIDA DNA DIR PROBE: CPT

## 2018-03-12 PROCEDURE — 99999 PR PBB SHADOW E&M-EST. PATIENT-LVL III: CPT | Mod: PBBFAC,,, | Performed by: NURSE PRACTITIONER

## 2018-03-12 PROCEDURE — 87491 CHLMYD TRACH DNA AMP PROBE: CPT

## 2018-03-12 RX ORDER — FLUCONAZOLE 150 MG/1
150 TABLET ORAL ONCE
Qty: 2 TABLET | Refills: 1 | Status: SHIPPED | OUTPATIENT
Start: 2018-03-12 | End: 2018-03-12

## 2018-03-12 NOTE — PROGRESS NOTES
CC: burning with urination, vaginal pain    HPI: Pt is a 73 y.o.  female who presents c/o the left side of her vaginal burning with urination got the past 2 days.  Denies any associated frequency, urgency, back pain, or fever.  Pt with a h/o HSV on daily suppressive Valtrex.  Denies any HSV outbreaks since 2014.  Reports she had intercourse  4 days ago and had to stop d/t discomfort.  BP today is elevated 190/102.  Denies any SOB, CP or vision changes. She has appt with PCP in 2 days.        ROS:  GENERAL: Feeling well overall.   SKIN: Denies rash or lesions.   HEAD: Denies head injury or headache.   NODES: Denies enlarged lymph nodes.   CHEST: Denies chest pain or shortness of breath.   CARDIOVASCULAR: Denies palpitations or left sided chest pain.   ABDOMEN: No abdominal pain, nausea, vomiting or rectal bleeding.   URINARY: No dysuria or hematuria.  REPRODUCTIVE: See HPI.   BREASTS: Denies pain, lumps, or nipple discharge.   HEMATOLOGIC: No easy bruisability or excessive bleeding.   MUSCULOSKELETAL: Denies joint pain or swelling.   NEUROLOGIC: Denies syncope or weakness.   PSYCHIATRIC: Denies depression.    PE:    APPEARANCE: Well nourished, well developed, in no acute distress.  NODES: No inguinal lymph node enlargement.  ABDOMEN: Soft. No tenderness or masses. No hernias.  BREASTS: Symmetrical, no skin changes or visible lesions. No palpable masses, nipple discharge or adenopathy bilaterally.  PELVIC: Normal external female genitalia without lesions. Normal hair distribution. Adequate perineal body, normal urethral meatus. Vagina with small abrasion to introitus.  Vagina without lesions. + thick discharge. No significant cystocele or rectocele. Uterus and cervix surgically absent. Bimanual exam revealed no masses, tenderness or abnormality.  ANUS: Normal.    Diagnosis:  1. Dysuria    2. Vaginal irritation    3. Elevated blood pressure reading          PLAN:  Urine culture  GCCT  Affirm  Diflucan  Discussed OTC  Vitamin A &D ointment to introitus as a barrier  Discussed to call PCP today with BP reading.  Discussed importance of BP in good control d/t increased risk of stroke, MI, or death      Orders Placed This Encounter    Vaginosis Screen by DNA Probe    C. trachomatis/N. gonorrhoeae by AMP DNA Urethra    Urine culture    fluconazole (DIFLUCAN) 150 MG Tab     F/U PRN no resolution of symptoms    Fide Schmidt, FNP-C

## 2018-03-13 ENCOUNTER — TELEPHONE (OUTPATIENT)
Dept: OBSTETRICS AND GYNECOLOGY | Facility: CLINIC | Age: 73
End: 2018-03-13

## 2018-03-13 LAB
C TRACH DNA SPEC QL NAA+PROBE: NOT DETECTED
N GONORRHOEA DNA SPEC QL NAA+PROBE: NOT DETECTED

## 2018-03-13 NOTE — TELEPHONE ENCOUNTER
Returning patients call. Patient notified of negative vaginosis culture. Informed patient that her GCCT is pending and she will receive a call once received.

## 2018-03-13 NOTE — TELEPHONE ENCOUNTER
----- Message from Nadno Herman sent at 3/13/2018  9:18 AM CDT -----  PT SAW YOU YESTERDAY  [facility]  AND SHE WAS TOLD YOU WILL CALL HER WITH HER RESULTS SHE CAN BE REACHED @ [phone]

## 2018-03-16 PROBLEM — S46.002A INJURY OF LEFT ROTATOR CUFF: Status: ACTIVE | Noted: 2018-03-16

## 2018-03-16 PROBLEM — R92.8 ABNORMAL MAMMOGRAM: Status: ACTIVE | Noted: 2018-03-16

## 2018-03-16 PROBLEM — Z12.39 BREAST CANCER SCREENING, HIGH RISK PATIENT: Status: ACTIVE | Noted: 2018-03-16

## 2018-03-16 PROBLEM — Z13.220 SCREENING FOR HYPERLIPIDEMIA: Status: ACTIVE | Noted: 2018-03-16

## 2018-03-16 PROBLEM — Z91.89 AT HIGH RISK FOR BREAST CANCER: Status: ACTIVE | Noted: 2018-03-16

## 2018-05-22 PROBLEM — M54.50 LUMBAR PAIN: Status: ACTIVE | Noted: 2018-05-22

## 2018-05-22 PROBLEM — M25.551 RIGHT HIP PAIN: Status: ACTIVE | Noted: 2018-05-22

## 2018-06-18 PROBLEM — Z13.220 SCREENING FOR HYPERLIPIDEMIA: Status: RESOLVED | Noted: 2018-03-16 | Resolved: 2018-06-18

## 2018-07-05 PROBLEM — M79.2 NEUROPATHIC PAIN: Status: ACTIVE | Noted: 2018-07-05

## 2018-07-05 PROBLEM — M54.41 ACUTE RIGHT-SIDED BACK PAIN WITH SCIATICA: Status: ACTIVE | Noted: 2018-07-05

## 2018-10-11 PROBLEM — R63.5 WEIGHT GAIN: Status: ACTIVE | Noted: 2018-10-11

## 2018-10-11 PROBLEM — R60.9 FLUID RETENTION: Status: ACTIVE | Noted: 2018-10-11

## 2018-11-27 PROBLEM — R06.02 SOB (SHORTNESS OF BREATH): Status: ACTIVE | Noted: 2018-11-27

## 2018-11-27 PROBLEM — J20.9 ACUTE BRONCHITIS: Status: ACTIVE | Noted: 2018-11-27

## 2018-11-27 PROBLEM — E87.70 HYPERVOLEMIA: Status: ACTIVE | Noted: 2018-11-27

## 2018-11-27 PROBLEM — J45.901 MODERATE ASTHMA WITH EXACERBATION: Status: ACTIVE | Noted: 2018-11-27

## 2019-03-21 PROBLEM — J20.9 ACUTE BRONCHITIS: Status: RESOLVED | Noted: 2018-11-27 | Resolved: 2019-03-21

## 2019-03-21 PROBLEM — Z12.39 BREAST CANCER SCREENING, HIGH RISK PATIENT: Status: RESOLVED | Noted: 2018-03-16 | Resolved: 2019-03-21

## 2019-03-21 PROBLEM — M54.50 LUMBAR PAIN: Status: RESOLVED | Noted: 2018-05-22 | Resolved: 2019-03-21

## 2019-03-21 PROBLEM — M25.551 RIGHT HIP PAIN: Status: RESOLVED | Noted: 2018-05-22 | Resolved: 2019-03-21

## 2019-03-21 PROBLEM — J45.41: Status: RESOLVED | Noted: 2017-10-23 | Resolved: 2019-03-21

## 2019-03-21 PROBLEM — R92.8 ABNORMAL MAMMOGRAM: Status: RESOLVED | Noted: 2018-03-16 | Resolved: 2019-03-21

## 2019-03-21 PROBLEM — Z91.89 AT HIGH RISK FOR BREAST CANCER: Status: RESOLVED | Noted: 2018-03-16 | Resolved: 2019-03-21

## 2019-03-21 PROBLEM — R42 VERTIGO: Status: RESOLVED | Noted: 2017-08-15 | Resolved: 2019-03-21

## 2019-03-21 PROBLEM — R07.9 CHEST PAIN: Status: RESOLVED | Noted: 2017-08-14 | Resolved: 2019-03-21

## 2019-03-21 PROBLEM — R53.1 LEFT-SIDED WEAKNESS: Status: RESOLVED | Noted: 2017-08-15 | Resolved: 2019-03-21

## 2019-03-21 PROBLEM — R63.5 WEIGHT GAIN: Status: RESOLVED | Noted: 2018-10-11 | Resolved: 2019-03-21

## 2019-03-21 PROBLEM — E87.70 HYPERVOLEMIA: Status: RESOLVED | Noted: 2018-11-27 | Resolved: 2019-03-21

## 2019-03-21 PROBLEM — S46.002A INJURY OF LEFT ROTATOR CUFF: Status: RESOLVED | Noted: 2018-03-16 | Resolved: 2019-03-21

## 2019-03-21 PROBLEM — J45.901 MODERATE ASTHMA WITH EXACERBATION: Status: RESOLVED | Noted: 2018-11-27 | Resolved: 2019-03-21

## 2019-12-18 ENCOUNTER — OFFICE VISIT (OUTPATIENT)
Dept: OBSTETRICS AND GYNECOLOGY | Facility: CLINIC | Age: 74
End: 2019-12-18
Payer: COMMERCIAL

## 2019-12-18 VITALS
SYSTOLIC BLOOD PRESSURE: 98 MMHG | HEIGHT: 62 IN | BODY MASS INDEX: 39.76 KG/M2 | WEIGHT: 216.06 LBS | DIASTOLIC BLOOD PRESSURE: 50 MMHG

## 2019-12-18 DIAGNOSIS — E78.5 DYSLIPIDEMIA: ICD-10-CM

## 2019-12-18 DIAGNOSIS — G40.909 SEIZURE DISORDER: ICD-10-CM

## 2019-12-18 DIAGNOSIS — J45.20 ASTHMA, INTERMITTENT, UNCOMPLICATED: ICD-10-CM

## 2019-12-18 DIAGNOSIS — Z01.419 ENCOUNTER FOR GYNECOLOGICAL EXAMINATION WITHOUT ABNORMAL FINDING: Primary | ICD-10-CM

## 2019-12-18 DIAGNOSIS — E55.9 VITAMIN D DEFICIENCY: ICD-10-CM

## 2019-12-18 DIAGNOSIS — M15.9 PRIMARY OSTEOARTHRITIS INVOLVING MULTIPLE JOINTS: ICD-10-CM

## 2019-12-18 DIAGNOSIS — G47.33 OSA ON CPAP: ICD-10-CM

## 2019-12-18 DIAGNOSIS — Z12.31 VISIT FOR SCREENING MAMMOGRAM: ICD-10-CM

## 2019-12-18 DIAGNOSIS — R76.8 POSITIVE ANA (ANTINUCLEAR ANTIBODY): ICD-10-CM

## 2019-12-18 DIAGNOSIS — I10 HTN, GOAL BELOW 140/90: ICD-10-CM

## 2019-12-18 DIAGNOSIS — Z86.39 HISTORY OF VITAMIN D DEFICIENCY: ICD-10-CM

## 2019-12-18 DIAGNOSIS — M85.80 OSTEOPENIA, UNSPECIFIED LOCATION: ICD-10-CM

## 2019-12-18 DIAGNOSIS — N18.2 CKD (CHRONIC KIDNEY DISEASE) STAGE 2, GFR 60-89 ML/MIN: ICD-10-CM

## 2019-12-18 DIAGNOSIS — Z86.19 HISTORY OF HERPES GENITALIS: ICD-10-CM

## 2019-12-18 PROCEDURE — 99999 PR PBB SHADOW E&M-EST. PATIENT-LVL II: CPT | Mod: PBBFAC,,, | Performed by: OBSTETRICS & GYNECOLOGY

## 2019-12-18 PROCEDURE — 99397 PR PREVENTIVE VISIT,EST,65 & OVER: ICD-10-PCS | Mod: S$GLB,,, | Performed by: OBSTETRICS & GYNECOLOGY

## 2019-12-18 PROCEDURE — 99999 PR PBB SHADOW E&M-EST. PATIENT-LVL II: ICD-10-PCS | Mod: PBBFAC,,, | Performed by: OBSTETRICS & GYNECOLOGY

## 2019-12-18 PROCEDURE — 99397 PER PM REEVAL EST PAT 65+ YR: CPT | Mod: S$GLB,,, | Performed by: OBSTETRICS & GYNECOLOGY

## 2019-12-18 RX ORDER — FLUTICASONE PROPIONATE AND SALMETEROL 250; 50 UG/1; UG/1
1 POWDER RESPIRATORY (INHALATION)
COMMUNITY

## 2019-12-18 RX ORDER — GABAPENTIN 300 MG/1
300 CAPSULE ORAL
COMMUNITY
End: 2019-12-18 | Stop reason: SDUPTHER

## 2019-12-18 RX ORDER — MONTELUKAST SODIUM 10 MG/1
10 TABLET ORAL
COMMUNITY

## 2019-12-18 RX ORDER — TELMISARTAN 80 MG/1
80 TABLET ORAL
COMMUNITY
End: 2019-12-18 | Stop reason: SDUPTHER

## 2019-12-18 RX ORDER — AZITHROMYCIN 500 MG/1
TABLET, FILM COATED ORAL
Refills: 0 | COMMUNITY
Start: 2019-12-04 | End: 2019-12-18 | Stop reason: SDUPTHER

## 2019-12-18 RX ORDER — HYDROCHLOROTHIAZIDE 25 MG/1
25 TABLET ORAL DAILY
Refills: 3 | COMMUNITY
Start: 2019-11-22

## 2019-12-18 RX ORDER — LEVETIRACETAM 500 MG/1
500 TABLET ORAL
COMMUNITY
End: 2019-12-18 | Stop reason: SDUPTHER

## 2019-12-18 RX ORDER — VALACYCLOVIR HYDROCHLORIDE 500 MG/1
500 TABLET, FILM COATED ORAL
COMMUNITY
End: 2019-12-18 | Stop reason: SDUPTHER

## 2019-12-18 RX ORDER — ROSUVASTATIN CALCIUM 20 MG/1
20 TABLET, COATED ORAL
COMMUNITY
End: 2019-12-18 | Stop reason: SDUPTHER

## 2019-12-18 RX ORDER — NAPROXEN SODIUM 220 MG
220 TABLET ORAL
COMMUNITY

## 2019-12-18 RX ORDER — METHYLPREDNISOLONE 4 MG/1
TABLET ORAL
Refills: 0 | COMMUNITY
Start: 2019-12-04 | End: 2019-12-18 | Stop reason: SDUPTHER

## 2019-12-18 RX ORDER — LATANOPROST 50 UG/ML
SOLUTION/ DROPS OPHTHALMIC
Refills: 1 | COMMUNITY
Start: 2019-12-02

## 2019-12-18 RX ORDER — ASPIRIN 81 MG/1
81 TABLET ORAL
COMMUNITY

## 2019-12-18 RX ORDER — BENZONATATE 100 MG/1
CAPSULE ORAL
Refills: 0 | COMMUNITY
Start: 2019-12-03 | End: 2019-12-18 | Stop reason: SDUPTHER

## 2019-12-18 RX ORDER — GLUCOSAMINE/CHONDRO SU A 500-400 MG
1 TABLET ORAL
COMMUNITY
End: 2019-12-18

## 2019-12-18 RX ORDER — ALBUTEROL SULFATE 90 UG/1
2 AEROSOL, METERED RESPIRATORY (INHALATION)
COMMUNITY

## 2019-12-18 NOTE — PROGRESS NOTES
HISTORY OF PRESENT ILLNESS:    Shazia Mai is a 74 y.o. female, , No LMP recorded. Patient has had a hysterectomy.,  presents for a routine exam and has no complaints.    Past Medical History:   Diagnosis Date    Asthma     Dyslipidemia     Hypertension     Obesity due to energy imbalance     Seizures        Past Surgical History:   Procedure Laterality Date    FRACTURE SURGERY  ,    R foot    HERNIA REPAIR      R inguinal    HYSTERECTOMY      INNER EAR SURGERY         MEDICATIONS AND ALLERGIES:      Current Outpatient Medications:     acetaminophen (TYLENOL) 325 MG tablet, Take 2 tablets (650 mg total) by mouth every 8 (eight) hours as needed., Disp: , Rfl: 0    ADVAIR DISKUS 250-50 mcg/dose diskus inhaler, INHALE 1 PUFF INTO THE LUNGS TWICE A DAY, Disp: 180 each, Rfl: 3    albuterol (PROVENTIL) 2.5 mg /3 mL (0.083 %) nebulizer solution, TAKE 3MLS BY NEBULIZATION EVERY 6 HOURS AS NEEDED FOR WHEEZING, Disp: 75 mL, Rfl: 0    aspirin (ECOTRIN) 81 MG EC tablet, Take 81 mg by mouth., Disp: , Rfl:     ergocalciferol (ERGOCALCIFEROL) 50,000 unit Cap, TAKE 1 CAPSULE BY MOUTH EVERY 30 days, Disp: 3 capsule, Rfl: prn    fluticasone-salmeterol diskus inhaler 250-50 mcg, Inhale 1 puff into the lungs., Disp: , Rfl:     gabapentin (NEURONTIN) 300 MG capsule, Take 300 mg by mouth 3 (three) times daily., Disp: , Rfl:     hydroCHLOROthiazide (HYDRODIURIL) 25 MG tablet, Take 25 mg by mouth once daily., Disp: , Rfl: 3    latanoprost 0.005 % ophthalmic solution, place 1 drop in each eye every night at bedtime, Disp: , Rfl: 1    levETIRAcetam (KEPPRA) 500 MG Tab, TAKE 1 TABLET BY MOUTH TWICE A DAY, Disp: 180 tablet, Rfl: 3    rosuvastatin (CRESTOR) 20 MG tablet, TAKE 1 TABLET BY MOUTH EVERY DAY, Disp: 90 tablet, Rfl: 3    telmisartan (MICARDIS) 80 MG Tab, TAKE 1 TABLET BY MOUTH DAILY, Disp: 90 tablet, Rfl: 1    valACYclovir (VALTREX) 500 MG tablet, TAKE 1 TABLET BY MOUTH EVERY DAY, Disp: 90  tablet, Rfl: 3    albuterol (PROVENTIL/VENTOLIN HFA) 90 mcg/actuation inhaler, Inhale 2 puffs into the lungs., Disp: , Rfl:     montelukast (SINGULAIR) 10 mg tablet, Take 10 mg by mouth., Disp: , Rfl:     multivitamin with minerals tablet, Take 1 tablet by mouth once daily., Disp: , Rfl:     naproxen sodium (ANAPROX) 220 MG tablet, Take 220 mg by mouth., Disp: , Rfl:     Review of patient's allergies indicates:   Allergen Reactions    Avelox [moxifloxacin]     Duratuss [pseudoephedrine-guaifenesin]        Family History   Problem Relation Age of Onset    Hypertension Mother     Glaucoma Mother     Cancer Father         colon    Breast cancer Neg Hx     Ovarian cancer Neg Hx        Social History     Socioeconomic History    Marital status:      Spouse name: Not on file    Number of children: 2    Years of education: Ass     Highest education level: Not on file   Occupational History    Occupation: retired   Social Needs    Financial resource strain: Not hard at all    Food insecurity:     Worry: Never true     Inability: Never true    Transportation needs:     Medical: No     Non-medical: No   Tobacco Use    Smoking status: Never Smoker    Smokeless tobacco: Never Used   Substance and Sexual Activity    Alcohol use: Yes     Comment: occasional    Drug use: No    Sexual activity: Yes     Partners: Male     Birth control/protection: Surgical   Lifestyle    Physical activity:     Days per week: 0 days     Minutes per session: 0 min    Stress: To some extent   Relationships    Social connections:     Talks on phone: More than three times a week     Gets together: More than three times a week     Attends Rastafarian service: Not on file     Active member of club or organization: Not on file     Attends meetings of clubs or organizations: Not on file     Relationship status: Not on file   Other Topics Concern    Not on file   Social History Narrative    Not on file       COMPREHENSIVE  "GYN HISTORY:  PAP History: Denies abnormal Paps.  Infection History: Denies STDs. Denies PID.  Benign History: Denies uterine fibroids. Denies ovarian cysts. Denies endometriosis. Denies other conditions.  Cancer History: Denies cervical cancer. Denies uterine cancer or hyperplasia. Denies ovarian cancer. Denies vulvar cancer or pre-cancer. Denies vaginal cancer or pre-cancer. Denies breast cancer. Denies colon cancer.  Sexual Activity History: Reports currently being sexually active  Menstrual History: Monthly. Mod then light flow.   Dysmenorrhea History: Reports mild dysmenorrhea.       ROS:  GENERAL: No weight changes. No swelling. No fatigue. No fever.  CARDIOVASCULAR: No chest pain. No shortness of breath. No leg cramps.   NEUROLOGICAL: No headaches. No vision changes.  BREASTS: No pain. No lumps. No discharge.  ABDOMEN: No pain. No nausea. No vomiting. No diarrhea. No constipation.  REPRODUCTIVE: No abnormal bleeding.   VULVA: No pain. No lesions. No itching.  VAGINA: No relaxation. No itching. No odor. No discharge. No lesions.  URINARY: No incontinence. No nocturia. No frequency. No dysuria.    BP (!) 98/50   Ht 5' 2" (1.575 m)   Wt 98 kg (216 lb 0.8 oz)   BMI 39.52 kg/m²     PE:  APPEARANCE: Well nourished, well developed, in no acute distress.  AFFECT: WNL, alert and oriented x 3.  SKIN: No acne or hirsutism.  NECK: Neck symmetric, without masses or thyromegaly.  NODES: No inguinal, cervical, axillary or femoral lymph node enlargement.  CHEST: Good respiratory effort.   ABDOMEN: Soft. No tenderness or masses. No hepatosplenomegaly. No hernias.  BREASTS: Symmetrical, no skin changes, visible lesions, palpable masses or nipple discharge bilaterally.  PELVIC: External female genitalia without lesions.  Female hair distribution. Adequate perineal body, Normal urethral meatus. Vagina moist and well rugated without lesions or discharge.  No significant cystocele or rectocele present. Cervix pink without " lesions, discharge or tenderness. Uterus is 4-6 week size, regular, mobile and nontender. Adnexa without masses or tenderness.  EXTREMITIES: No edema    DIAGNOSIS:  1. Encounter for gynecological examination without abnormal finding    2. Visit for screening mammogram    3. DORI on CPAP    4. Osteopenia, unspecified location    5. Primary osteoarthritis involving multiple joints    6. Vitamin D deficiency    7. History of vitamin D deficiency    8. BMI 38.0-38.9,adult    9. Positive SHAY (antinuclear antibody)    10. History of herpes genitalis    11. CKD (chronic kidney disease) stage 2, GFR 60-89 ml/min    12. HTN, goal below 140/90    13. Dyslipidemia    14. Asthma, intermittent, uncomplicated    15. Seizure disorder        PLAN:         COUNSELING:  The patient was counseled today on:  -A.C.S. Pap and pelvic exam guidelines (pap every 3 years), recomendations for yearly mammogram;  -to follow up with her PCP for other health maintenance.    FOLLOW-UP with me annually.

## 2020-07-28 ENCOUNTER — NURSE TRIAGE (OUTPATIENT)
Dept: ADMINISTRATIVE | Facility: CLINIC | Age: 75
End: 2020-07-28

## 2020-07-29 ENCOUNTER — OFFICE VISIT (OUTPATIENT)
Dept: OBSTETRICS AND GYNECOLOGY | Facility: CLINIC | Age: 75
End: 2020-07-29
Payer: COMMERCIAL

## 2020-07-29 VITALS
HEIGHT: 62 IN | DIASTOLIC BLOOD PRESSURE: 82 MMHG | WEIGHT: 222.69 LBS | BODY MASS INDEX: 40.98 KG/M2 | SYSTOLIC BLOOD PRESSURE: 128 MMHG

## 2020-07-29 DIAGNOSIS — N90.7 LABIAL CYST: Primary | ICD-10-CM

## 2020-07-29 PROCEDURE — 99999 PR PBB SHADOW E&M-EST. PATIENT-LVL III: CPT | Mod: PBBFAC,,, | Performed by: NURSE PRACTITIONER

## 2020-07-29 PROCEDURE — 99213 PR OFFICE/OUTPT VISIT, EST, LEVL III, 20-29 MIN: ICD-10-PCS | Mod: S$GLB,,, | Performed by: NURSE PRACTITIONER

## 2020-07-29 PROCEDURE — 87070 CULTURE OTHR SPECIMN AEROBIC: CPT

## 2020-07-29 PROCEDURE — 99213 OFFICE O/P EST LOW 20 MIN: CPT | Mod: S$GLB,,, | Performed by: NURSE PRACTITIONER

## 2020-07-29 PROCEDURE — 99999 PR PBB SHADOW E&M-EST. PATIENT-LVL III: ICD-10-PCS | Mod: PBBFAC,,, | Performed by: NURSE PRACTITIONER

## 2020-07-29 NOTE — TELEPHONE ENCOUNTER
Pt states she was bathing tonight and noticed a painful bump in her vagina, she believes she is having a herpes outbreak, she would like someone from Dr. Stubbs's office to reach out to her and [facility] is [facility], advised her to call back with any worsening symptoms or any concerns, caller agreed    Reason for Disposition   Patient is worried about sexually transmitted disease (STD)    Protocols used: ST VAGINAL SYMPTOMS-A-AH

## 2020-07-29 NOTE — TELEPHONE ENCOUNTER
I spoke to the pt and informed her to keep her appointment today with walk [facility] and I was just calling to check on her to make sure she was ok and doing fine after her call last night.

## 2020-07-29 NOTE — PROGRESS NOTES
CC: herpes outbreak    History of Present Illness: Shazia Mai is a 75 y.o. female that presents today 7/29/2020   [facility] c/o possible herpes outbreak. She reports that she takes daily suppressive valtrex (500mg). She has not had an outbreak since 2014. She noticed an area of discomfort 2 days ago. She denies any vaginal discharge, itching, burning or odor. No other complaints or concerns noted.       Past Medical History:   Diagnosis Date    Asthma     Dyslipidemia     Hypertension     Obesity due to energy imbalance     Seizures        Past Surgical History:   Procedure Laterality Date    FRACTURE SURGERY  1990,1998    R foot    HERNIA REPAIR      R inguinal    HYSTERECTOMY  1979    INNER EAR SURGERY         Current Outpatient Medications   Medication Sig Dispense Refill    acetaminophen (TYLENOL) 325 MG tablet Take 2 tablets (650 mg total) by mouth every 8 (eight) hours as needed.  0    ADVAIR DISKUS 250-50 mcg/dose diskus inhaler INHALE 1 PUFF INTO THE LUNGS TWICE A  each 3    albuterol (PROVENTIL) 2.5 mg /3 mL (0.083 %) nebulizer solution TAKE 3MLS BY NEBULIZATION EVERY 6 HOURS AS NEEDED FOR WHEEZING 75 mL 0    albuterol (PROVENTIL/VENTOLIN HFA) 90 mcg/actuation inhaler Inhale 2 puffs into the lungs.      aspirin (ECOTRIN) 81 MG EC tablet Take 81 mg by mouth.      ergocalciferol (ERGOCALCIFEROL) 50,000 unit Cap TAKE 1 CAPSULE BY MOUTH EVERY 30 days 3 capsule prn    fluticasone-salmeterol diskus inhaler 250-50 mcg Inhale 1 puff into the lungs.      gabapentin (NEURONTIN) 300 MG capsule Take 300 mg by mouth 3 (three) times daily.      hydroCHLOROthiazide (HYDRODIURIL) 25 MG tablet Take 25 mg by mouth once daily.  3    latanoprost 0.005 % ophthalmic solution place 1 drop in each eye every night at bedtime  1    levETIRAcetam (KEPPRA) 500 MG Tab TAKE 1 TABLET BY MOUTH TWICE A  tablet 3    montelukast (SINGULAIR) 10 mg tablet Take 10 mg  "by mouth.      multivitamin with minerals tablet Take 1 tablet by mouth once daily.      naproxen sodium (ANAPROX) 220 MG tablet Take 220 mg by mouth.      rosuvastatin (CRESTOR) 20 MG tablet TAKE 1 TABLET BY MOUTH EVERY DAY 90 tablet 3    telmisartan (MICARDIS) 80 MG Tab TAKE 1 TABLET BY MOUTH DAILY 90 tablet 1    valACYclovir (VALTREX) 500 MG tablet TAKE 1 TABLET BY MOUTH EVERY DAY 90 tablet 3     No current facility-administered medications for this visit.        Review of patient's allergies indicates:   Allergen Reactions    Avelox [moxifloxacin]     Duratuss [pseudoephedrine-guaifenesin]        Family History   Problem Relation Age of Onset    Hypertension Mother     Glaucoma Mother     Cancer Father         colon    Breast cancer Neg Hx     Ovarian cancer Neg Hx        Social History     Tobacco Use    Smoking status: Never Smoker    Smokeless tobacco: Never Used   Substance Use Topics    Alcohol use: Yes     Comment: occasional    Drug use: No       OB History    Para Term  AB Living   2 2       2   SAB TAB Ectopic Multiple Live Births           2      # Outcome Date GA Lbr Suhail/2nd Weight Sex Delivery Anes PTL Lv   2 Para      Vag-Spont  N ANAND   1 Para      Vag-Spont  N ANAND       Review of Symptoms:  GENERAL: Denies weight gain or weight loss. Feeling well overall.   SKIN: Denies rash or lesions.   HEAD: Denies head injury or headache.   NODES: Denies enlarged lymph nodes.   CHEST: Denies chest pain or shortness of breath.   CARDIOVASCULAR: Denies palpitations or left sided chest pain.   ABDOMEN: No abdominal pain, constipation, diarrhea, nausea, vomiting or rectal bleeding.  REPRODUCTIVE: No vaginal discharge, itching, burning or odor. No pelvic pain. No vaginal bleeding. + vaginal discomfort.   URINARY: No frequency, dysuria, hematuria, or burning on urination.    /82   Ht 5' 2" (1.575 m)   Wt 101 kg (222 lb 10.6 oz)   Physical Exam:  APPEARANCE: Well nourished, well " developed, in no acute distress.  SKIN: No acne or hirsutism.  CHEST:  Normal respiratory effort.  ABDOMEN:  Soft.  No tenderness or masses.  No distention. No hernias palpated.   VULVA: normal appearing vulva with no masses, tenderness or lesions. ~1 cm cyst on L labia majora; purulent drainage expressed with minimal pressure. No erythema or surrounding cellulitis noted.     ASSESSMENT/PLAN:  Labial cyst  -     Aerobic culture      -Discussed with pt that this is not a herpes outbreak. Discussed cyst, which appears to be resolving. Advised doing warm compresses BID until completely resolved and keeping area clean and dry. F/u if not completely resolved in the next 72 hours. Pt verbalized understanding.     Follow-up:  RTC if symptoms worsen or do not improve  RTC as needed

## 2020-08-01 LAB — BACTERIA SPEC AEROBE CULT: NORMAL

## 2021-01-13 ENCOUNTER — OFFICE VISIT (OUTPATIENT)
Dept: OBSTETRICS AND GYNECOLOGY | Facility: CLINIC | Age: 76
End: 2021-01-13
Payer: MEDICARE

## 2021-01-13 VITALS — BODY MASS INDEX: 40.73 KG/M2 | HEIGHT: 62 IN

## 2021-01-13 DIAGNOSIS — Z01.419 ENCOUNTER FOR GYNECOLOGICAL EXAMINATION WITHOUT ABNORMAL FINDING: Primary | ICD-10-CM

## 2021-01-13 PROCEDURE — 99397 PR PREVENTIVE VISIT,EST,65 & OVER: ICD-10-PCS | Mod: S$GLB,,, | Performed by: OBSTETRICS & GYNECOLOGY

## 2021-01-13 PROCEDURE — 99999 PR PBB SHADOW E&M-EST. PATIENT-LVL III: CPT | Mod: PBBFAC,,, | Performed by: OBSTETRICS & GYNECOLOGY

## 2021-01-13 PROCEDURE — 99999 PR PBB SHADOW E&M-EST. PATIENT-LVL III: ICD-10-PCS | Mod: PBBFAC,,, | Performed by: OBSTETRICS & GYNECOLOGY

## 2021-01-13 PROCEDURE — 99397 PER PM REEVAL EST PAT 65+ YR: CPT | Mod: S$GLB,,, | Performed by: OBSTETRICS & GYNECOLOGY

## 2021-01-13 RX ORDER — ERGOCALCIFEROL 1.25 MG/1
1 CAPSULE ORAL
COMMUNITY
Start: 2020-02-22

## 2021-01-13 RX ORDER — INFLUENZA A VIRUS A/MICHIGAN/45/2015 X-275 (H1N1) ANTIGEN (FORMALDEHYDE INACTIVATED), INFLUENZA A VIRUS A/SINGAPORE/INFIMH-16-0019/2016 IVR-186 (H3N2) ANTIGEN (FORMALDEHYDE INACTIVATED), INFLUENZA B VIRUS B/PHUKET/3073/2013 ANTIGEN (FORMALDEHYDE INACTIVATED), AND INFLUENZA B VIRUS B/MARYLAND/15/2016 BX-69A ANTIGEN (FORMALDEHYDE INACTIVATED) 60; 60; 60; 60 UG/.7ML; UG/.7ML; UG/.7ML; UG/.7ML
INJECTION, SUSPENSION INTRAMUSCULAR
COMMUNITY
Start: 2020-11-06

## 2021-01-13 RX ORDER — ALLOPURINOL 100 MG/1
100 TABLET ORAL DAILY
COMMUNITY
Start: 2020-12-09

## 2021-01-19 ENCOUNTER — IMMUNIZATION (OUTPATIENT)
Dept: INTERNAL MEDICINE | Facility: CLINIC | Age: 76
End: 2021-01-19
Payer: MEDICARE

## 2021-01-19 DIAGNOSIS — Z23 NEED FOR VACCINATION: Primary | ICD-10-CM

## 2021-01-19 PROCEDURE — 91300 COVID-19, MRNA, LNP-S, PF, 30 MCG/0.3 ML DOSE VACCINE: CPT | Mod: PBBFAC | Performed by: INTERNAL MEDICINE

## 2021-02-09 ENCOUNTER — IMMUNIZATION (OUTPATIENT)
Dept: INTERNAL MEDICINE | Facility: CLINIC | Age: 76
End: 2021-02-09
Payer: MEDICARE

## 2021-02-09 DIAGNOSIS — Z23 NEED FOR VACCINATION: Primary | ICD-10-CM

## 2021-02-09 PROCEDURE — 91300 COVID-19, MRNA, LNP-S, PF, 30 MCG/0.3 ML DOSE VACCINE: CPT | Mod: PBBFAC | Performed by: INTERNAL MEDICINE

## 2021-02-09 PROCEDURE — 0002A COVID-19, MRNA, LNP-S, PF, 30 MCG/0.3 ML DOSE VACCINE: CPT | Mod: PBBFAC | Performed by: INTERNAL MEDICINE

## 2022-04-27 ENCOUNTER — OFFICE VISIT (OUTPATIENT)
Dept: OBSTETRICS AND GYNECOLOGY | Facility: CLINIC | Age: 77
End: 2022-04-27
Payer: COMMERCIAL

## 2022-04-27 VITALS
BODY MASS INDEX: 39.76 KG/M2 | SYSTOLIC BLOOD PRESSURE: 126 MMHG | WEIGHT: 217.38 LBS | DIASTOLIC BLOOD PRESSURE: 78 MMHG

## 2022-04-27 DIAGNOSIS — E55.9 VITAMIN D DEFICIENCY: ICD-10-CM

## 2022-04-27 DIAGNOSIS — N18.2 CKD (CHRONIC KIDNEY DISEASE) STAGE 2, GFR 60-89 ML/MIN: ICD-10-CM

## 2022-04-27 DIAGNOSIS — M15.9 PRIMARY OSTEOARTHRITIS INVOLVING MULTIPLE JOINTS: ICD-10-CM

## 2022-04-27 DIAGNOSIS — M85.80 OSTEOPENIA, UNSPECIFIED LOCATION: ICD-10-CM

## 2022-04-27 DIAGNOSIS — G47.33 OSA ON CPAP: ICD-10-CM

## 2022-04-27 DIAGNOSIS — G40.909 SEIZURE DISORDER: ICD-10-CM

## 2022-04-27 DIAGNOSIS — Z12.31 VISIT FOR SCREENING MAMMOGRAM: ICD-10-CM

## 2022-04-27 DIAGNOSIS — J45.20 ASTHMA, INTERMITTENT, UNCOMPLICATED: ICD-10-CM

## 2022-04-27 DIAGNOSIS — N89.8 VAGINAL DISCHARGE: ICD-10-CM

## 2022-04-27 DIAGNOSIS — Z01.411 ENCOUNTER FOR GYNECOLOGICAL EXAMINATION WITH ABNORMAL FINDING: Primary | ICD-10-CM

## 2022-04-27 DIAGNOSIS — E78.5 DYSLIPIDEMIA: ICD-10-CM

## 2022-04-27 DIAGNOSIS — Z86.39 HISTORY OF VITAMIN D DEFICIENCY: ICD-10-CM

## 2022-04-27 PROCEDURE — 99397 PER PM REEVAL EST PAT 65+ YR: CPT | Mod: S$GLB,,, | Performed by: OBSTETRICS & GYNECOLOGY

## 2022-04-27 PROCEDURE — 99999 PR PBB SHADOW E&M-EST. PATIENT-LVL II: CPT | Mod: PBBFAC,,, | Performed by: OBSTETRICS & GYNECOLOGY

## 2022-04-27 PROCEDURE — 99999 PR PBB SHADOW E&M-EST. PATIENT-LVL II: ICD-10-PCS | Mod: PBBFAC,,, | Performed by: OBSTETRICS & GYNECOLOGY

## 2022-04-27 PROCEDURE — 99397 PR PREVENTIVE VISIT,EST,65 & OVER: ICD-10-PCS | Mod: S$GLB,,, | Performed by: OBSTETRICS & GYNECOLOGY

## 2022-04-27 PROCEDURE — 87481 CANDIDA DNA AMP PROBE: CPT | Mod: 59 | Performed by: OBSTETRICS & GYNECOLOGY

## 2022-04-27 NOTE — PROGRESS NOTES
HISTORY OF PRESENT ILLNESS:    Shazia Mai is a 77 y.o. female,   presents today for her routine visit and has no complaints.      Past Medical History:   Diagnosis Date    Asthma     Dyslipidemia     Hypertension     Obesity due to energy imbalance     Seizures        Past Surgical History:   Procedure Laterality Date    FRACTURE SURGERY  ,    R foot    HERNIA REPAIR      R inguinal    HYSTERECTOMY      INNER EAR SURGERY         MEDICATIONS AND ALLERGIES:      Current Outpatient Medications:     acetaminophen (TYLENOL) 325 MG tablet, Take 2 tablets (650 mg total) by mouth every 8 (eight) hours as needed., Disp: , Rfl: 0    ADVAIR DISKUS 250-50 mcg/dose diskus inhaler, INHALE 1 PUFF INTO THE LUNGS TWICE A DAY, Disp: 180 each, Rfl: 3    albuterol (PROVENTIL) 2.5 mg /3 mL (0.083 %) nebulizer solution, TAKE 3MLS BY NEBULIZATION EVERY 6 HOURS AS NEEDED FOR WHEEZING, Disp: 75 mL, Rfl: 0    albuterol (PROVENTIL/VENTOLIN HFA) 90 mcg/actuation inhaler, Inhale 2 puffs into the lungs., Disp: , Rfl:     allopurinoL (ZYLOPRIM) 100 MG tablet, Take 100 mg by mouth once daily., Disp: , Rfl:     aspirin (ECOTRIN) 81 MG EC tablet, Take 81 mg by mouth., Disp: , Rfl:     ergocalciferol (ERGOCALCIFEROL) 50,000 unit Cap, TAKE 1 CAPSULE BY MOUTH EVERY 30 days, Disp: 3 capsule, Rfl: prn    ergocalciferol (ERGOCALCIFEROL) 50,000 unit Cap, Take 1 capsule by mouth., Disp: , Rfl:     fluticasone-salmeterol diskus inhaler 250-50 mcg, Inhale 1 puff into the lungs., Disp: , Rfl:     FLUZONE HIGHDOSE QUAD 20-21  mcg/0.7 mL Syrg, PHARMACY ADMINISTERED, Disp: , Rfl:     gabapentin (NEURONTIN) 300 MG capsule, Take 300 mg by mouth 3 (three) times daily., Disp: , Rfl:     hydroCHLOROthiazide (HYDRODIURIL) 25 MG tablet, Take 25 mg by mouth once daily., Disp: , Rfl: 3    latanoprost 0.005 % ophthalmic solution, place 1 drop in each eye every night at bedtime, Disp: , Rfl: 1    levETIRAcetam (KEPPRA)  500 MG Tab, TAKE 1 TABLET BY MOUTH TWICE A DAY, Disp: 180 tablet, Rfl: 3    montelukast (SINGULAIR) 10 mg tablet, Take 10 mg by mouth., Disp: , Rfl:     multivitamin with minerals tablet, Take 1 tablet by mouth once daily., Disp: , Rfl:     naproxen sodium (ANAPROX) 220 MG tablet, Take 220 mg by mouth., Disp: , Rfl:     rosuvastatin (CRESTOR) 20 MG tablet, TAKE 1 TABLET BY MOUTH EVERY DAY, Disp: 90 tablet, Rfl: 3    telmisartan (MICARDIS) 80 MG Tab, TAKE 1 TABLET BY MOUTH DAILY, Disp: 90 tablet, Rfl: 1    valACYclovir (VALTREX) 500 MG tablet, TAKE 1 TABLET BY MOUTH EVERY DAY, Disp: 90 tablet, Rfl: 3    Review of patient's allergies indicates:   Allergen Reactions    Avelox [moxifloxacin]     Duratuss [pseudoephedrine-guaifenesin]        Family History   Problem Relation Age of Onset    Hypertension Mother     Glaucoma Mother     Cancer Father         colon    Breast cancer Neg Hx     Ovarian cancer Neg Hx        Social History     Socioeconomic History    Marital status:     Number of children: 2    Years of education: Ass    Occupational History    Occupation: retired   Tobacco Use    Smoking status: Never Smoker    Smokeless tobacco: Never Used   Substance and Sexual Activity    Alcohol use: Yes     Comment: occasional    Drug use: No    Sexual activity: Yes     Partners: Male     Birth control/protection: Surgical       COMPREHENSIVE GYN HISTORY:  PAP History: Denies abnormal Paps.  Infection History: Denies STDs. Denies PID.  Benign History: Denies uterine fibroids. Denies ovarian cysts. Denies endometriosis. Denies other conditions.  Cancer History: Denies cervical cancer. Denies uterine cancer or hyperplasia. Denies ovarian cancer. Denies vulvar cancer or pre-cancer. Denies vaginal cancer or pre-cancer. Denies breast cancer. Denies colon cancer.  Sexual Activity History: Reports currently being sexually active  Menstrual History: Denies menses. Pt is  not on ERT.      ROS:  GENERAL: No weight changes. No swelling. No fatigue. No fever.  CARDIOVASCULAR: No chest pain. No shortness of breath. No leg cramps.   NEUROLOGICAL: No headaches. No vision changes.  BREASTS: No pain. No lumps. No discharge.  ABDOMEN: No pain. No nausea. No vomiting. No diarrhea. No constipation.  REPRODUCTIVE: No abnormal bleeding.  VULVA: No pain. No lesions. No itching.  VAGINA: No relaxation. No itching. No odor. No discharge. No lesions.  URINARY: No incontinence. No nocturia. No frequency. No dysuria.    /78   Wt 98.6 kg (217 lb 6 oz)   BMI 39.76 kg/m²     PE:  APPEARANCE: Well nourished, well developed, in no acute distress.  AFFECT: WNL, alert and oriented x 3.  SKIN: No acne or hirsutism.  NECK: Neck symmetric without masses or thyromegaly.  NODES: No inguinal, cervical, axillary or femoral lymph node enlargement.  CHEST: Good respiratory effort.   ABDOMEN: Soft. No tenderness or masses. No hepatosplenomegaly. No hernias.  BREASTS: Symmetrical, no skin changes or visible lesions. No palpable masses, nipple discharge bilaterally.  PELVIC: ATROPHIC EXTERNAL FEMALE GENITALIA without lesions. Normal hair distribution. Adequate perineal body, normal urethral meatus. VAGINA DRY without lesions or discharge. No significant cystocele or rectocele. Bimanual exam shows CERVIX and UTERUS to be SURGICALLY ABSENT. Adnexa without masses or tenderness.  EXTREMITIES: No edema.    DIAGNOSIS:  1. Encounter for gynecological examination with abnormal finding    2. Visit for screening mammogram    3. Seizure disorder    4. Asthma, intermittent, uncomplicated    5. Dyslipidemia    6. CKD (chronic kidney disease) stage 2, GFR 60-89 ml/min    7. BMI 38.0-38.9,adult    8. History of vitamin D deficiency    9. Vitamin D deficiency    10. Primary osteoarthritis involving multiple joints    11. Osteopenia, unspecified location    12. DORI on CPAP    13. Vaginal discharge        Orders Placed This Encounter     Vaginosis Screen by DNA Probe       COUNSELING:  The patient was counseled today on  -osteoporosis prevention, calcium supplementation, regular weight bearing exercise.  -ACS PAP guidelines (no paps), with recommendations for yearly pelvic exams as her uterus and cervix were removed for benign reasons and ovaries remain;  -recommendation for yearly mammogram;  -to see her primary care physician for all other health maintenance.    FOLLOW-UP with me for next routine visit.

## 2022-04-28 ENCOUNTER — TELEPHONE (OUTPATIENT)
Dept: OBSTETRICS AND GYNECOLOGY | Facility: CLINIC | Age: 77
End: 2022-04-28
Payer: COMMERCIAL

## 2022-04-28 DIAGNOSIS — N76.0 BV (BACTERIAL VAGINOSIS): Primary | ICD-10-CM

## 2022-04-28 DIAGNOSIS — B96.89 BV (BACTERIAL VAGINOSIS): Primary | ICD-10-CM

## 2022-04-28 LAB
BACTERIAL VAGINOSIS DNA: POSITIVE
CANDIDA GLABRATA DNA: NEGATIVE
CANDIDA KRUSEI DNA: NEGATIVE
CANDIDA RRNA VAG QL PROBE: NEGATIVE
T VAGINALIS RRNA GENITAL QL PROBE: NEGATIVE

## 2022-04-28 RX ORDER — METRONIDAZOLE 7.5 MG/G
1 GEL VAGINAL DAILY
Qty: 1 G | Refills: 0 | Status: SHIPPED | OUTPATIENT
Start: 2022-04-28 | End: 2022-05-05

## 2023-08-09 ENCOUNTER — OFFICE VISIT (OUTPATIENT)
Dept: OBSTETRICS AND GYNECOLOGY | Facility: CLINIC | Age: 78
End: 2023-08-09
Payer: COMMERCIAL

## 2023-08-09 VITALS
SYSTOLIC BLOOD PRESSURE: 108 MMHG | DIASTOLIC BLOOD PRESSURE: 72 MMHG | BODY MASS INDEX: 37.73 KG/M2 | WEIGHT: 205 LBS | HEIGHT: 62 IN

## 2023-08-09 DIAGNOSIS — I10 HTN, GOAL BELOW 140/90: ICD-10-CM

## 2023-08-09 DIAGNOSIS — J45.40 ASTHMA WITHOUT STATUS ASTHMATICUS, MODERATE PERSISTENT, UNCOMPLICATED: ICD-10-CM

## 2023-08-09 DIAGNOSIS — N18.2 CKD (CHRONIC KIDNEY DISEASE) STAGE 2, GFR 60-89 ML/MIN: ICD-10-CM

## 2023-08-09 DIAGNOSIS — Z86.19 HISTORY OF HERPES GENITALIS: ICD-10-CM

## 2023-08-09 DIAGNOSIS — M85.80 OSTEOPENIA, UNSPECIFIED LOCATION: ICD-10-CM

## 2023-08-09 DIAGNOSIS — B37.2 YEAST INFECTION OF THE SKIN: ICD-10-CM

## 2023-08-09 DIAGNOSIS — Z12.31 SCREENING MAMMOGRAM, ENCOUNTER FOR: ICD-10-CM

## 2023-08-09 DIAGNOSIS — Z01.419 ENCOUNTER FOR GYNECOLOGICAL EXAMINATION WITHOUT ABNORMAL FINDING: Primary | ICD-10-CM

## 2023-08-09 PROCEDURE — 99397 PR PREVENTIVE VISIT,EST,65 & OVER: ICD-10-PCS | Mod: S$GLB,,, | Performed by: OBSTETRICS & GYNECOLOGY

## 2023-08-09 PROCEDURE — 99999 PR PBB SHADOW E&M-EST. PATIENT-LVL III: ICD-10-PCS | Mod: PBBFAC,,, | Performed by: OBSTETRICS & GYNECOLOGY

## 2023-08-09 PROCEDURE — 99999 PR PBB SHADOW E&M-EST. PATIENT-LVL III: CPT | Mod: PBBFAC,,, | Performed by: OBSTETRICS & GYNECOLOGY

## 2023-08-09 PROCEDURE — 99397 PER PM REEVAL EST PAT 65+ YR: CPT | Mod: S$GLB,,, | Performed by: OBSTETRICS & GYNECOLOGY

## 2023-08-09 RX ORDER — HYDROCHLOROTHIAZIDE 12.5 MG/1
12.5 TABLET ORAL
COMMUNITY
Start: 2023-04-19

## 2023-08-09 RX ORDER — NYSTATIN 100000 [USP'U]/G
POWDER TOPICAL
Qty: 180 G | Refills: 0 | Status: SHIPPED | OUTPATIENT
Start: 2023-08-09 | End: 2024-08-08

## 2023-08-09 RX ORDER — NYSTATIN 100000 U/G
CREAM TOPICAL 2 TIMES DAILY
Qty: 90 G | Refills: 3 | Status: SHIPPED | OUTPATIENT
Start: 2023-08-09 | End: 2023-08-23

## 2023-08-09 RX ORDER — PREDNISOLONE ACETATE 10 MG/ML
1 SUSPENSION/ DROPS OPHTHALMIC 4 TIMES DAILY
COMMUNITY
Start: 2023-06-08

## 2023-08-09 NOTE — PROGRESS NOTES
HISTORY OF PRESENT ILLNESS:    Shazia Mai is a 78 y.o. female, , No LMP recorded. Patient has had a hysterectomy.,  presents for a routine exam and has no complaints.    Past Medical History:   Diagnosis Date    Asthma     Dyslipidemia     Hypertension     Obesity due to energy imbalance     Seizures        Past Surgical History:   Procedure Laterality Date    FRACTURE SURGERY  ,    R foot    HERNIA REPAIR      R inguinal    HYSTERECTOMY  1979    INNER EAR SURGERY      Thyroid goiter  2022       MEDICATIONS AND ALLERGIES:      Current Outpatient Medications:     acetaminophen (TYLENOL) 325 MG tablet, Take 2 tablets (650 mg total) by mouth every 8 (eight) hours as needed., Disp: , Rfl: 0    ADVAIR DISKUS 250-50 mcg/dose diskus inhaler, INHALE 1 PUFF INTO THE LUNGS TWICE A DAY, Disp: 180 each, Rfl: 3    albuterol (PROVENTIL) 2.5 mg /3 mL (0.083 %) nebulizer solution, TAKE 3MLS BY NEBULIZATION EVERY 6 HOURS AS NEEDED FOR WHEEZING, Disp: 75 mL, Rfl: 0    albuterol (PROVENTIL/VENTOLIN HFA) 90 mcg/actuation inhaler, Inhale 2 puffs into the lungs., Disp: , Rfl:     allopurinoL (ZYLOPRIM) 100 MG tablet, Take 100 mg by mouth once daily., Disp: , Rfl:     aspirin (ECOTRIN) 81 MG EC tablet, Take 81 mg by mouth., Disp: , Rfl:     ergocalciferol (ERGOCALCIFEROL) 50,000 unit Cap, TAKE 1 CAPSULE BY MOUTH EVERY 30 days, Disp: 3 capsule, Rfl: prn    ergocalciferol (ERGOCALCIFEROL) 50,000 unit Cap, Take 1 capsule by mouth., Disp: , Rfl:     fluticasone-salmeterol diskus inhaler 250-50 mcg, Inhale 1 puff into the lungs., Disp: , Rfl:     FLUZONE HIGHDOSE QUAD 20-21  mcg/0.7 mL Syrg, PHARMACY ADMINISTERED, Disp: , Rfl:     gabapentin (NEURONTIN) 300 MG capsule, Take 300 mg by mouth 3 (three) times daily., Disp: , Rfl:     hydroCHLOROthiazide (HYDRODIURIL) 12.5 MG Tab, Take 12.5 mg by mouth., Disp: , Rfl:     hydroCHLOROthiazide (HYDRODIURIL) 25 MG tablet, Take 25 mg by mouth once daily., Disp: ,  Rfl: 3    latanoprost 0.005 % ophthalmic solution, place 1 drop in each eye every night at bedtime, Disp: , Rfl: 1    levETIRAcetam (KEPPRA) 500 MG Tab, TAKE 1 TABLET BY MOUTH TWICE A DAY, Disp: 180 tablet, Rfl: 3    montelukast (SINGULAIR) 10 mg tablet, Take 10 mg by mouth., Disp: , Rfl:     multivitamin with minerals tablet, Take 1 tablet by mouth once daily., Disp: , Rfl:     naproxen sodium (ANAPROX) 220 MG tablet, Take 220 mg by mouth., Disp: , Rfl:     prednisoLONE acetate (PRED FORTE) 1 % DrpS, Place 1 drop into the right eye 4 (four) times daily., Disp: , Rfl:     rosuvastatin (CRESTOR) 20 MG tablet, TAKE 1 TABLET BY MOUTH EVERY DAY, Disp: 90 tablet, Rfl: 3    telmisartan (MICARDIS) 80 MG Tab, TAKE 1 TABLET BY MOUTH DAILY, Disp: 90 tablet, Rfl: 1    valACYclovir (VALTREX) 500 MG tablet, TAKE 1 TABLET BY MOUTH EVERY DAY, Disp: 90 tablet, Rfl: 3    Review of patient's allergies indicates:   Allergen Reactions    Duratuss [pseudoephedrine-guaifenesin]     Moxifloxacin Other (See Comments)     GI upset    Phenylephrine-guaifenesin Itching       Family History   Problem Relation Age of Onset    Hypertension Mother     Glaucoma Mother     Cancer Father         colon    Breast cancer Neg Hx     Ovarian cancer Neg Hx        Social History     Socioeconomic History    Marital status:     Number of children: 2    Years of education: Ass    Occupational History    Occupation: retired   Tobacco Use    Smoking status: Never    Smokeless tobacco: Never   Substance and Sexual Activity    Alcohol use: Yes     Comment: occasional    Drug use: No    Sexual activity: Yes     Partners: Male     Birth control/protection: Surgical     Social Determinants of Health     Financial Resource Strain: Low Risk  (3/21/2019)    Overall Financial Resource Strain (CARDIA)     Difficulty of Paying Living Expenses: Not hard at all   Food Insecurity: No Food Insecurity (3/21/2019)    Hunger Vital Sign     Worried About Running Out of  Food in the Last Year: Never true     Ran Out of Food in the Last Year: Never true   Transportation Needs: No Transportation Needs (3/21/2019)    PRAPARE - Transportation     Lack of Transportation (Medical): No     Lack of Transportation (Non-Medical): No   Physical Activity: Inactive (3/21/2019)    Exercise Vital Sign     Days of Exercise per Week: 0 days     Minutes of Exercise per Session: 0 min   Stress: Stress Concern Present (3/21/2019)    [employer] - Occupational Stress Questionnaire     Feeling of Stress : To some extent   Social Connections: Unknown (3/21/2019)    Social Connection and Isolation Panel [NHANES]     Frequency of Communication with Friends and Family: More than three times a week     Frequency of Social Gatherings with Friends and Family: More than three times a week       COMPREHENSIVE GYN HISTORY:  PAP History: Denies abnormal Paps.  Infection History: Denies STDs. Denies PID.  Benign History: Denies uterine fibroids. Denies ovarian cysts. Denies endometriosis. Denies other conditions.  Cancer History: Denies cervical cancer. Denies uterine cancer or hyperplasia. Denies ovarian cancer. Denies vulvar cancer or pre-cancer. Denies vaginal cancer or pre-cancer. Denies breast cancer. Denies colon cancer.  Sexual Activity History: Reports currently being sexually active  Menstrual History: Monthly. Mod then light flow.   Dysmenorrhea History: Reports mild dysmenorrhea.       ROS:  GENERAL: No weight changes. No swelling. No fatigue. No fever.  CARDIOVASCULAR: No chest pain. No shortness of breath. No leg cramps.   NEUROLOGICAL: No headaches. No vision changes.  BREASTS: No pain. No lumps. No discharge.  ABDOMEN: No pain. No nausea. No vomiting. No diarrhea. No constipation.  REPRODUCTIVE: No abnormal bleeding.   VULVA: No pain. No lesions. No itching.  VAGINA: No relaxation. No itching. No odor. No discharge. No lesions.  URINARY: No incontinence. No nocturia. No  "frequency. No dysuria.    /72   Ht 5' 2" (1.575 m)   Wt 93 kg (205 lb 0.4 oz)   BMI 37.50 kg/m²     PE:  APPEARANCE: Well nourished, well developed, in no acute distress.  AFFECT: WNL, alert and oriented x 3.  SKIN: No acne or hirsutism.  NECK: Neck symmetric, without masses or thyromegaly.  NODES: No inguinal, cervical, axillary or femoral lymph node enlargement.  CHEST: Good respiratory effort.   ABDOMEN: Soft. No tenderness or masses. No hepatosplenomegaly. No hernias.  BREASTS: Symmetrical, no skin changes, visible lesions, palpable masses or nipple discharge bilaterally.  PELVIC: External female genitalia without lesions.  Female hair distribution. Adequate perineal body, Normal urethral meatus. Vagina moist and well rugated without lesions or discharge.  Adnexa without masses or tenderness.  EXTREMITIES: No edema    DIAGNOSIS:  1. Encounter for gynecological examination without abnormal finding    2. Screening mammogram, encounter for    3. Asthma without status asthmaticus    4. HTN, goal below 140/90    5. CKD (chronic kidney disease) stage 2, GFR 60-89 ml/min    6. History of herpes genitalis    7. Osteopenia, unspecified location      PLAN:    Orders Placed This Encounter    Mammo Digital Screening Bilat w/ [facility]       COUNSELING:  The patient was counseled today on:  -A.C.S. Pap and pelvic exam guidelines (pap every 3 years), recomendations for yearly mammogram;  -to follow up with her PCP for other health maintenance.    FOLLOW-UP with me annually.   "